# Patient Record
Sex: MALE | Race: WHITE | NOT HISPANIC OR LATINO | URBAN - METROPOLITAN AREA
[De-identification: names, ages, dates, MRNs, and addresses within clinical notes are randomized per-mention and may not be internally consistent; named-entity substitution may affect disease eponyms.]

---

## 2019-12-30 ENCOUNTER — EMERGENCY (EMERGENCY)
Facility: HOSPITAL | Age: 59
LOS: 0 days | Discharge: HOME | End: 2019-12-31
Attending: EMERGENCY MEDICINE | Admitting: EMERGENCY MEDICINE
Payer: SUBSIDIZED

## 2019-12-30 VITALS
RESPIRATION RATE: 18 BRPM | TEMPERATURE: 98 F | SYSTOLIC BLOOD PRESSURE: 163 MMHG | HEART RATE: 87 BPM | DIASTOLIC BLOOD PRESSURE: 80 MMHG | OXYGEN SATURATION: 100 %

## 2019-12-30 DIAGNOSIS — K40.90 UNILATERAL INGUINAL HERNIA, WITHOUT OBSTRUCTION OR GANGRENE, NOT SPECIFIED AS RECURRENT: ICD-10-CM

## 2019-12-30 DIAGNOSIS — R10.9 UNSPECIFIED ABDOMINAL PAIN: ICD-10-CM

## 2019-12-30 LAB
ALBUMIN SERPL ELPH-MCNC: 4.3 G/DL — SIGNIFICANT CHANGE UP (ref 3.5–5.2)
ALP SERPL-CCNC: 70 U/L — SIGNIFICANT CHANGE UP (ref 30–115)
ALT FLD-CCNC: 15 U/L — SIGNIFICANT CHANGE UP (ref 0–41)
ANION GAP SERPL CALC-SCNC: 13 MMOL/L — SIGNIFICANT CHANGE UP (ref 7–14)
APPEARANCE UR: CLEAR — SIGNIFICANT CHANGE UP
AST SERPL-CCNC: 25 U/L — SIGNIFICANT CHANGE UP (ref 0–41)
BILIRUB DIRECT SERPL-MCNC: <0.2 MG/DL — SIGNIFICANT CHANGE UP (ref 0–0.2)
BILIRUB INDIRECT FLD-MCNC: >0 MG/DL — LOW (ref 0.2–1.2)
BILIRUB SERPL-MCNC: 0.2 MG/DL — SIGNIFICANT CHANGE UP (ref 0.2–1.2)
BILIRUB UR-MCNC: NEGATIVE — SIGNIFICANT CHANGE UP
BUN SERPL-MCNC: 10 MG/DL — SIGNIFICANT CHANGE UP (ref 10–20)
CALCIUM SERPL-MCNC: 9.7 MG/DL — SIGNIFICANT CHANGE UP (ref 8.5–10.1)
CHLORIDE SERPL-SCNC: 101 MMOL/L — SIGNIFICANT CHANGE UP (ref 98–110)
CO2 SERPL-SCNC: 25 MMOL/L — SIGNIFICANT CHANGE UP (ref 17–32)
COLOR SPEC: SIGNIFICANT CHANGE UP
CREAT SERPL-MCNC: 0.8 MG/DL — SIGNIFICANT CHANGE UP (ref 0.7–1.5)
DIFF PNL FLD: NEGATIVE — SIGNIFICANT CHANGE UP
GLUCOSE SERPL-MCNC: 113 MG/DL — HIGH (ref 70–99)
GLUCOSE UR QL: NEGATIVE — SIGNIFICANT CHANGE UP
HCT VFR BLD CALC: 43.3 % — SIGNIFICANT CHANGE UP (ref 42–52)
HGB BLD-MCNC: 14.2 G/DL — SIGNIFICANT CHANGE UP (ref 14–18)
KETONES UR-MCNC: NEGATIVE — SIGNIFICANT CHANGE UP
LACTATE SERPL-SCNC: 1 MMOL/L — SIGNIFICANT CHANGE UP (ref 0.7–2)
LEUKOCYTE ESTERASE UR-ACNC: NEGATIVE — SIGNIFICANT CHANGE UP
LIDOCAIN IGE QN: 24 U/L — SIGNIFICANT CHANGE UP (ref 7–60)
MCHC RBC-ENTMCNC: 27.5 PG — SIGNIFICANT CHANGE UP (ref 27–31)
MCHC RBC-ENTMCNC: 32.8 G/DL — SIGNIFICANT CHANGE UP (ref 32–37)
MCV RBC AUTO: 83.9 FL — SIGNIFICANT CHANGE UP (ref 80–94)
NITRITE UR-MCNC: NEGATIVE — SIGNIFICANT CHANGE UP
NRBC # BLD: 0 /100 WBCS — SIGNIFICANT CHANGE UP (ref 0–0)
PH UR: 6 — SIGNIFICANT CHANGE UP (ref 5–8)
PLATELET # BLD AUTO: 199 K/UL — SIGNIFICANT CHANGE UP (ref 130–400)
POTASSIUM SERPL-MCNC: 4.4 MMOL/L — SIGNIFICANT CHANGE UP (ref 3.5–5)
POTASSIUM SERPL-SCNC: 4.4 MMOL/L — SIGNIFICANT CHANGE UP (ref 3.5–5)
PROT SERPL-MCNC: 6.7 G/DL — SIGNIFICANT CHANGE UP (ref 6–8)
PROT UR-MCNC: NEGATIVE — SIGNIFICANT CHANGE UP
RBC # BLD: 5.16 M/UL — SIGNIFICANT CHANGE UP (ref 4.7–6.1)
RBC # FLD: 12.3 % — SIGNIFICANT CHANGE UP (ref 11.5–14.5)
SODIUM SERPL-SCNC: 139 MMOL/L — SIGNIFICANT CHANGE UP (ref 135–146)
SP GR SPEC: 1.02 — SIGNIFICANT CHANGE UP (ref 1.01–1.02)
UROBILINOGEN FLD QL: SIGNIFICANT CHANGE UP
WBC # BLD: 5.52 K/UL — SIGNIFICANT CHANGE UP (ref 4.8–10.8)
WBC # FLD AUTO: 5.52 K/UL — SIGNIFICANT CHANGE UP (ref 4.8–10.8)

## 2019-12-30 PROCEDURE — 99285 EMERGENCY DEPT VISIT HI MDM: CPT

## 2019-12-30 PROCEDURE — 74177 CT ABD & PELVIS W/CONTRAST: CPT | Mod: 26

## 2019-12-30 RX ORDER — MORPHINE SULFATE 50 MG/1
4 CAPSULE, EXTENDED RELEASE ORAL ONCE
Refills: 0 | Status: DISCONTINUED | OUTPATIENT
Start: 2019-12-30 | End: 2019-12-30

## 2019-12-30 RX ORDER — SODIUM CHLORIDE 9 MG/ML
1000 INJECTION INTRAMUSCULAR; INTRAVENOUS; SUBCUTANEOUS ONCE
Refills: 0 | Status: COMPLETED | OUTPATIENT
Start: 2019-12-30 | End: 2019-12-30

## 2019-12-30 RX ADMIN — SODIUM CHLORIDE 2000 MILLILITER(S): 9 INJECTION INTRAMUSCULAR; INTRAVENOUS; SUBCUTANEOUS at 23:37

## 2019-12-30 RX ADMIN — MORPHINE SULFATE 4 MILLIGRAM(S): 50 CAPSULE, EXTENDED RELEASE ORAL at 20:35

## 2019-12-30 NOTE — ED PROVIDER NOTE - NS ED ROS FT
Constitutional: no fever, chills, no recent weight loss, change in appetite or malaise  Eyes: no redness/discharge/pain/vision changes  ENT: no rhinorrhea/ear pain/sore throat  Cardiac: No chest pain, SOB or edema.  Respiratory: No cough or respiratory distress  GI: + rt lower abdominal pain/swelling. No nausea, vomiting, diarrhea   : No dysuria, frequency, urgency or hematuria  MS: no pain to back or extremities, no loss of ROM, no weakness  Neuro: No headache or weakness. No LOC.  Skin: No skin rash.  Endocrine: No history of thyroid disease or diabetes.  Except as documented in the HPI, all other systems are negative.

## 2019-12-30 NOTE — ED ADULT NURSE NOTE - NSIMPLEMENTINTERV_GEN_ALL_ED
Implemented All Universal Safety Interventions:  [place] to call system. Call bell, personal items and telephone within reach. Instruct patient to call for assistance. Room bathroom lighting operational. Non-slip footwear when patient is off stretcher. Physically safe environment: no spills, clutter or unnecessary equipment. Stretcher in lowest position, wheels locked, appropriate side rails in place.

## 2019-12-30 NOTE — ED PROVIDER NOTE - NSFOLLOWUPCLINICS_GEN_ALL_ED_FT
[facility]  Surgery  [address] [place] [address]  Phone: [phone]  Fax:   Follow Up Time: 4-6 Days

## 2019-12-30 NOTE — ED PROVIDER NOTE - ATTENDING CONTRIBUTION TO CARE
Pt with right sided lower abd pain for the past 2 days.  pt was sent in for evaluation.  no cp, no sob, no fevers, no chills, no nausea, no vomiting.  pt has had pain for over a week but worse past 2 days.  pt also noticed hernia to right groin fort he past 3 weeks that goes in and out.  no urinary complaints.  no dysuria.    awake, alert.  abd soft with + RLQ tenderness.  Right groin with inguinal hernia that is reducible in the ED, no erythema.    p: labs, ct, reassess.

## 2019-12-30 NOTE — ED PROVIDER NOTE - PHYSICAL EXAMINATION
CONSTITUTIONAL: Well-appearing; well-nourished; in no apparent distress.   NECK: Supple; non-tender; no cervical lymphadenopathy.   CARDIOVASCULAR: Normal S1, S2; no murmurs, rubs, or gallops.   RESPIRATORY: Normal chest excursion with respiration; breath sounds clear and equal bilaterally; no wheezes, rhonchi, or rales.  GI/: Normal bowel sounds; non-distended; + mild RLQ ttp. No rebound or guarding. + rt sided reducible inguinal hernia  MS: No evidence of trauma or deformity. Normal ROM in all four extremities; non-tender to palpation; distal pulses are normal.   SKIN: Normal for age and race; warm; dry; good turgor; no apparent lesions or exudate.   NEURO/PSYCH: A & O x 4; grossly unremarkable. mood and manner are appropriate. Grooming and personal hygiene are appropriate. No apparent thoughts of harm to self or others.

## 2019-12-30 NOTE — ED PROVIDER NOTE - PATIENT PORTAL LINK FT
You can access the FollowMyHealth Patient Portal offered by [facility] by registering at the following website: [email or web address]. By joining [facility]’s FollowMyHealth portal, you will also be able to view your health information using other applications (apps) compatible with our system.

## 2019-12-30 NOTE — ED PROVIDER NOTE - PROGRESS NOTE DETAILS
pt with persistent pain.  CT with nonobstructed inguinal hernia.  hernia had gone in and out, but currently unable to be reduced.  Pt signed out to Dr. Reaves to follow up surgery consult and to reassess and dispo feeling well. no abdominal tenderness and no inguinal hernia tenderness. surgery evaluated patient. hernia easily reducible. f/u surgical clinic.

## 2019-12-30 NOTE — ED PROVIDER NOTE - NSFOLLOWUPINSTRUCTIONS_ED_ALL_ED_FT
Hernia    A hernia is when fat or the intestines push through a weak area in the abdominal wall. This can occur around the belly button (umbilical hernia) or where the leg meets the lower abdomen (inguinal hernia). This creates a rounded lump (bulge). Hernias that can be pushed back into the belly are reducible and those that cannot are called incarcerated. Hernias that are not reducible and lose their blood supply are called strangulated and require emergency surgery. Hernias can be caused when straining during bowel movements or lifting heavy objects as well as coughing.     SEEK IMMEDIATE MEDICAL CARE IF YOU HAVE THE FOLLOWING SYMPTOMS: worsening abdominal pain, change in color over the hernia, nausea/vomiting, or inability to have a bowel movement or pass gas.     Abdominal Pain    Many things can cause abdominal pain. Usually, abdominal pain is not caused by a disease and will improve without treatment. Your health care provider will do a physical exam and possibly order blood tests and imaging to help determine the seriousness of your pain. However, in many cases, no cause may be found and you may need further testing as an outpatient. Monitor your abdominal pain for any changes.     SEEK IMMEDIATE MEDICAL CARE IF YOU HAVE THE FOLLOWING SYMPTOMS: worsening abdominal pain, vomiting, diarrhea, inability to have bowel movements or pass gas, black or bloody stool, fever accompanying chest pain or back pain, or dizziness/lightheadedness.

## 2019-12-30 NOTE — ED PROVIDER NOTE - OBJECTIVE STATEMENT
58 year old M denies pmhx c/o rt lower abdominal pain/swelling x several weeks. Sts the pain has worsened over the last two days. Symptoms are worsened with coughing/sneezing, straining on the toilet. Denies any assoc fever/chills, nausea, vomiting, alterations in bowel habits, past surgical hx, chest pain, sob.

## 2019-12-30 NOTE — ED PROVIDER NOTE - CLINICAL SUMMARY MEDICAL DECISION MAKING FREE TEXT BOX
Patient reval post surgical consult and signout by Dr. Khan.  Pain is now in RUQ, not associated with hernia.  mild.  Abd benign.  Surgery requesting outpatient follow up in clinic for elective cholecystectomy and hernia repair.  Patient agreeable.  stable for discharge.  I have fully discussed the medical management and delivery of care with the patient. I have discussed any available labs, imaging and treatment options with the patient. Patient confirms understanding and has been given detailed return precautions. Patient instructed to return to the ED should symptoms persist or worsen. Patient has demonstrated capacity and has verbalized understanding. Patient is well appearing upon discharge.

## 2019-12-31 VITALS
DIASTOLIC BLOOD PRESSURE: 72 MMHG | SYSTOLIC BLOOD PRESSURE: 156 MMHG | OXYGEN SATURATION: 98 % | TEMPERATURE: 98 F | HEART RATE: 72 BPM | RESPIRATION RATE: 18 BRPM

## 2019-12-31 PROCEDURE — 99282 EMERGENCY DEPT VISIT SF MDM: CPT

## 2019-12-31 RX ORDER — HYDROCORTISONE 1 %
1 OINTMENT (GRAM) TOPICAL
Qty: 28 | Refills: 0
Start: 2019-12-31 | End: 2020-01-13

## 2019-12-31 RX ORDER — FAMOTIDINE 10 MG/ML
1 INJECTION INTRAVENOUS
Qty: 60 | Refills: 0
Start: 2019-12-31 | End: 2020-01-29

## 2019-12-31 NOTE — CONSULT NOTE ADULT - ASSESSMENT
Assessment: Patient is a 60 y/o M with PMHx of gallstones presents to the ED for worsening abdominal pain. CTAP reveals R inguinal hernia, spontaneously reduced in the ED.      Plan:  - monitor pain for the next hour.  If pain persists, then obtain abdominal US.  - f/u general surgery clinic Assessment:   Patient is a 58 y/o M with PMHx of gallstones dx 5y ago with no follow-up with a surgeon in the past, whom presents to the ED for intermittent R sided abdominal pain for 1 month. He was found to have a reducible R inguinal hernia containing bowel without evidence of obstruction on CTAP. He has cholelithiasis without any signs of cholecystitis.    Plan:  - Continue to monitor in ED for recurrence of abdominal pain after pain medication wears off  - If recurrent pain or worsening pain then US RUQ  - Labs, imaging, and clinical picture not suggestive of cholecystitis  - Hernia reducible with no skin changes or inflammatory changes on imaging, not likely attributing to his pain  - No acute surgical intervention indicated at this time  -  f/u general surgery clinic if symptoms persist or recur    Senior Surgical Resident Note  I have edited the above note and agree with the current treatment plan  Above plan was discussed with Dr. Park, patient, and the ED team  ---------------------------------------------------------------------------------------  12-31-19 @ 02:23

## 2019-12-31 NOTE — CONSULT NOTE ADULT - SUBJECTIVE AND OBJECTIVE BOX
HPI: Patient is a 58 y/o M with PMHx of gallstones presents to the ED for abdominal pain.  He states that he has had worsening pain for 1 month.  The pain has improved compared to several hours ago due to pain medication given in the ED.  He admits that this type of pain has occurred previously about 4-5 years ago.  Patient states that he has had 2 colonoscopies, the most recent being in 2018 where one polyp was removed.  He admits to having one BM this afternoon.        PAST MEDICAL & SURGICAL HISTORY:  Cholelithiasis    Allergies: No Known Allergies      REVIEW OF SYSTEMS    [x] A ten-point review of systems was otherwise negative except as noted.  [ ] Due to altered mental status/intubation, subjective information were not able to be obtained from the patient. History was obtained, to the extent possible, from review of the chart and collateral sources of information.      Vital Signs Last 24 Hrs  T(C): 36.8 (30 Dec 2019 15:25), Max: 36.8 (30 Dec 2019 15:25)  T(F): 98.3 (30 Dec 2019 15:25), Max: 98.3 (30 Dec 2019 15:25)  HR: 56 (30 Dec 2019 19:30) (56 - 87)  BP: 197/93 (30 Dec 2019 19:30) (163/80 - 197/93)  RR: 18 (30 Dec 2019 19:30) (18 - 18)  SpO2: 99% (30 Dec 2019 19:30) (99% - 100%)    PHYSICAL EXAM:  GENERAL: NAD, well-appearing  CHEST/LUNG: Clear to auscultation bilaterally  HEART: Regular rate and rhythm  ABDOMEN: Soft, mildly tender RLQ, Nondistended; R inguinal hernia spontaneously reducible  EXTREMITIES:  No clubbing, cyanosis, or edema      LABS:                       14.2   5.52  )-----------( 199      ( 30 Dec 2019 17:43 )             43.3             139  |  101  |  10  ----------------------------<  113<H>  4.4   |  25  |  0.8      Calcium, Total Serum: 9.7 mg/dL (- @ 17:43)      LFTs:             6.7  | 0.2  | 25       ------------------[70      ( 30 Dec 2019 17:43 )  4.3  | <0.2 | 15          Lipase:24            Lactate, Blood: 1.0 mmol/L (19 @ 17:43)    Urinalysis Basic - ( 30 Dec 2019 17:43 )    Color: Light Yellow / Appearance: Clear / S.020 / pH: x  Gluc: x / Ketone: Negative  / Bili: Negative / Urobili: <2 mg/dL   Blood: x / Protein: Negative / Nitrite: Negative   Leuk Esterase: Negative / RBC: x / WBC x   Sq Epi: x / Non Sq Epi: x / Bacteria: x      RADIOLOGY & ADDITIONAL STUDIES:    < from: CT Abdomen and Pelvis w/ IV Cont (19 @ 21:45) >  IMPRESSION:   No CT evidence of acute abdominopelvic pathology.   Right inguinal hernia containing loops of nonobstructed bowel.   < end of copied text >

## 2020-01-03 ENCOUNTER — APPOINTMENT (OUTPATIENT)
Dept: SURGERY | Facility: CLINIC | Age: 60
End: 2020-01-03
Payer: SUBSIDIZED

## 2020-01-03 VITALS
HEART RATE: 72 BPM | TEMPERATURE: 98.3 F | HEIGHT: 65 IN | DIASTOLIC BLOOD PRESSURE: 90 MMHG | SYSTOLIC BLOOD PRESSURE: 142 MMHG | BODY MASS INDEX: 26.49 KG/M2 | WEIGHT: 159 LBS

## 2020-01-03 DIAGNOSIS — Z78.9 OTHER SPECIFIED HEALTH STATUS: ICD-10-CM

## 2020-01-03 DIAGNOSIS — Z86.010 PERSONAL HISTORY OF COLONIC POLYPS: ICD-10-CM

## 2020-01-03 PROCEDURE — 99214 OFFICE O/P EST MOD 30 MIN: CPT

## 2020-01-03 NOTE — CONSULT LETTER
[Dear  ___] : Dear  [unfilled], [Please see my note below.] : Please see my note below. [Sincerely,] : Sincerely, [Consult Letter:] : I had the pleasure of evaluating your patient, [unfilled]. [FreeTextEntry3] : Caron Mittal MD\par Bariatric & Minimally Invasive Surgery\par [facility]\par [phone]\par

## 2020-01-03 NOTE — ASSESSMENT
[FreeTextEntry1] : 60yo male with no significant PMHx presenting with right inguinal hernia, possible bilateral inguinal hernia, +umbilical hernia - asx.\par -recent colonoscopy\par -risks, benefits, and alteratives were explained to the patient - including risk of bleeding, pain, and infection\par -all questions were answered\par -consent was obtained for LAPAROSCOPIC RIGHT INGUINAL HERNIA REPAIR WITH MESH, POSSIBLE OPEN, POSSIBLE BILATERAL AND ALL INDICATED PROCEDURES\par -will require PAT's\par -return to office post-operatively\par

## 2020-01-03 NOTE — PHYSICAL EXAM
[Normal Breath Sounds] : Normal breath sounds [Normal Heart Sounds] : normal heart sounds [Alert] : alert [Calm] : calm [de-identified] : no acute distress [de-identified] : soft, +right inguinal hernia, ?left inguinal hernia, +umbilical hernia, non-tender, no rebound/guarding, no skin changes [de-identified] : full ROM x 4

## 2020-01-03 NOTE — HISTORY OF PRESENT ILLNESS
[de-identified] : 58yo male with no known PMHx referred to office for evaluation of right inguinal hernia. Patient reports that he has been experiencing pain and swelling in the right groin over the past month. No previous episodes. No left sided pain. No obstructive symptoms. No fever/chills, nausea/vomiting. Patient had colonoscopy 1 year ago that demonstrated a polyp, which was removed.

## 2020-01-03 NOTE — DATA REVIEWED
[FreeTextEntry1] : CT A/P 12/30/2019 - no CT evidence of acute abdominopelvic pathology; +cholelithiasis, +mesenteric fat-containing umbilical hernia, +right inguinal hernia containing loops of non-obstructed bowel

## 2020-01-16 ENCOUNTER — FORM ENCOUNTER (OUTPATIENT)
Age: 60
End: 2020-01-16

## 2020-01-17 ENCOUNTER — OUTPATIENT (OUTPATIENT)
Dept: OUTPATIENT SERVICES | Facility: HOSPITAL | Age: 60
LOS: 1 days | Discharge: HOME | End: 2020-01-17
Payer: SUBSIDIZED

## 2020-01-17 VITALS
DIASTOLIC BLOOD PRESSURE: 76 MMHG | HEART RATE: 54 BPM | RESPIRATION RATE: 16 BRPM | TEMPERATURE: 97 F | HEIGHT: 65 IN | SYSTOLIC BLOOD PRESSURE: 159 MMHG | OXYGEN SATURATION: 100 % | WEIGHT: 165.57 LBS

## 2020-01-17 DIAGNOSIS — Z01.818 ENCOUNTER FOR OTHER PREPROCEDURAL EXAMINATION: ICD-10-CM

## 2020-01-17 DIAGNOSIS — K40.90 UNILATERAL INGUINAL HERNIA, WITHOUT OBSTRUCTION OR GANGRENE, NOT SPECIFIED AS RECURRENT: ICD-10-CM

## 2020-01-17 LAB
ALBUMIN SERPL ELPH-MCNC: 4.6 G/DL — SIGNIFICANT CHANGE UP (ref 3.5–5.2)
ALP SERPL-CCNC: 77 U/L — SIGNIFICANT CHANGE UP (ref 30–115)
ALT FLD-CCNC: 14 U/L — SIGNIFICANT CHANGE UP (ref 0–41)
ANION GAP SERPL CALC-SCNC: 11 MMOL/L — SIGNIFICANT CHANGE UP (ref 7–14)
APTT BLD: 35.6 SEC — SIGNIFICANT CHANGE UP (ref 27–39.2)
AST SERPL-CCNC: 22 U/L — SIGNIFICANT CHANGE UP (ref 0–41)
BILIRUB SERPL-MCNC: 0.3 MG/DL — SIGNIFICANT CHANGE UP (ref 0.2–1.2)
BUN SERPL-MCNC: 8 MG/DL — LOW (ref 10–20)
CALCIUM SERPL-MCNC: 9.3 MG/DL — SIGNIFICANT CHANGE UP (ref 8.5–10.1)
CHLORIDE SERPL-SCNC: 103 MMOL/L — SIGNIFICANT CHANGE UP (ref 98–110)
CO2 SERPL-SCNC: 28 MMOL/L — SIGNIFICANT CHANGE UP (ref 17–32)
CREAT SERPL-MCNC: 0.8 MG/DL — SIGNIFICANT CHANGE UP (ref 0.7–1.5)
GLUCOSE SERPL-MCNC: 101 MG/DL — HIGH (ref 70–99)
INR BLD: 0.97 RATIO — SIGNIFICANT CHANGE UP (ref 0.65–1.3)
POTASSIUM SERPL-MCNC: 4.4 MMOL/L — SIGNIFICANT CHANGE UP (ref 3.5–5)
POTASSIUM SERPL-SCNC: 4.4 MMOL/L — SIGNIFICANT CHANGE UP (ref 3.5–5)
PROT SERPL-MCNC: 6.7 G/DL — SIGNIFICANT CHANGE UP (ref 6–8)
PROTHROM AB SERPL-ACNC: 11.2 SEC — SIGNIFICANT CHANGE UP (ref 9.95–12.87)
SODIUM SERPL-SCNC: 142 MMOL/L — SIGNIFICANT CHANGE UP (ref 135–146)

## 2020-01-17 PROCEDURE — 93010 ELECTROCARDIOGRAM REPORT: CPT

## 2020-01-17 PROCEDURE — 71046 X-RAY EXAM CHEST 2 VIEWS: CPT | Mod: 26

## 2020-01-17 NOTE — H&P PST ADULT - NSANTHOSAYNRD_GEN_A_CORE
No. MARY BETH screening performed.  STOP BANG Legend: 0-2 = LOW Risk; 3-4 = INTERMEDIATE Risk; 5-8 = HIGH Risk

## 2020-01-17 NOTE — H&P PST ADULT - HISTORY OF PRESENT ILLNESS
59 year old male here with right hernia had for about 2 months  fos=2 denies chest pain sob palp  denies recent uri or uti

## 2020-01-21 ENCOUNTER — APPOINTMENT (OUTPATIENT)
Dept: INTERNAL MEDICINE | Facility: CLINIC | Age: 60
End: 2020-01-21
Payer: COMMERCIAL

## 2020-01-21 ENCOUNTER — FORM ENCOUNTER (OUTPATIENT)
Age: 60
End: 2020-01-21

## 2020-01-21 ENCOUNTER — OUTPATIENT (OUTPATIENT)
Dept: OUTPATIENT SERVICES | Facility: HOSPITAL | Age: 60
LOS: 1 days | Discharge: HOME | End: 2020-01-21

## 2020-01-21 VITALS
BODY MASS INDEX: 26.49 KG/M2 | WEIGHT: 159 LBS | HEIGHT: 65 IN | SYSTOLIC BLOOD PRESSURE: 157 MMHG | DIASTOLIC BLOOD PRESSURE: 94 MMHG

## 2020-01-21 DIAGNOSIS — R94.31 ABNORMAL ELECTROCARDIOGRAM [ECG] [EKG]: ICD-10-CM

## 2020-01-21 DIAGNOSIS — K40.90 UNILATERAL INGUINAL HERNIA, WITHOUT OBSTRUCTION OR GANGRENE, NOT SPECIFIED AS RECURRENT: ICD-10-CM

## 2020-01-21 PROBLEM — I10 ESSENTIAL (PRIMARY) HYPERTENSION: Chronic | Status: ACTIVE | Noted: 2020-01-17

## 2020-01-21 PROCEDURE — 99202 OFFICE O/P NEW SF 15 MIN: CPT | Mod: GC

## 2020-01-21 NOTE — HISTORY OF PRESENT ILLNESS
[No Pertinent Cardiac History] : no history of aortic stenosis, atrial fibrillation, coronary artery disease, recent myocardial infarction, or implantable device/pacemaker [No Pertinent Pulmonary History] : no history of asthma, COPD, sleep apnea, or smoking [No Adverse Anesthesia Reaction] : no adverse anesthesia reaction in self or family member [Good (7-10 METs)] : Good (7-10 METs) [Chronic Anticoagulation] : no chronic anticoagulation [Chronic Kidney Disease] : no chronic kidney disease [FreeTextEntry4] : 59 year old man with hx of biliary colic, non smoker, presenting for pre-op checkup. Patient was diagnosed with right sided abdominal inguinal hernia and will undergo surgery on 1/31/2020. He reports abdominal pain  still being present. no chest pain, has some palpitations. no dyspnea but reports some sob on mild exertion. no PND or orthopnea. reports that he can go up stairs with no difficulty./ no dizziness or headache. no diarrhea or vomiting. no cough. no FH of cardiac diseases [Diabetes] : no diabetes [FreeTextEntry7] : EKG showed sinus bradycardia and nonspecific T wave abnormality

## 2020-01-21 NOTE — ASSESSMENT
[Patient Requires Further Testing] : Patient requires further testing [Echocardiogram] : echocardiogram [FreeTextEntry4] : 59 year old man presenting for pre-op assessment for right inguinal hernia repair. he has some SOB on exertion and palpitations with no other symptoms. his bp was elevated on this visit\par \par Patient needs further evaluation by cardiologist before surgery\par \par Plan:\par 1- Echocardiogram\par 2- fu with cardio pre-op

## 2020-01-21 NOTE — PHYSICAL EXAM
[No Acute Distress] : no acute distress [Well Nourished] : well nourished [No JVD] : no jugular venous distention [No Respiratory Distress] : no respiratory distress  [Normal Rate] : normal rate  [Regular Rhythm] : with a regular rhythm [No Murmur] : no murmur heard [Normal S1, S2] : normal S1 and S2 [No Carotid Bruits] : no carotid bruits [No Edema] : there was no peripheral edema [Coordination Grossly Intact] : coordination grossly intact [Normal Affect] : the affect was normal [No Focal Deficits] : no focal deficits

## 2020-01-22 ENCOUNTER — OUTPATIENT (OUTPATIENT)
Dept: OUTPATIENT SERVICES | Facility: HOSPITAL | Age: 60
LOS: 1 days | Discharge: HOME | End: 2020-01-22
Payer: SUBSIDIZED

## 2020-01-22 ENCOUNTER — APPOINTMENT (OUTPATIENT)
Dept: INTERNAL MEDICINE | Facility: CLINIC | Age: 60
End: 2020-01-22

## 2020-01-22 DIAGNOSIS — R94.31 ABNORMAL ELECTROCARDIOGRAM [ECG] [EKG]: ICD-10-CM

## 2020-01-22 PROCEDURE — 93306 TTE W/DOPPLER COMPLETE: CPT | Mod: 26

## 2020-02-04 ENCOUNTER — OUTPATIENT (OUTPATIENT)
Dept: OUTPATIENT SERVICES | Facility: HOSPITAL | Age: 60
LOS: 1 days | Discharge: HOME | End: 2020-02-04

## 2020-02-04 ENCOUNTER — APPOINTMENT (OUTPATIENT)
Dept: CARDIOLOGY | Facility: CLINIC | Age: 60
End: 2020-02-04
Payer: COMMERCIAL

## 2020-02-04 VITALS
HEIGHT: 65 IN | OXYGEN SATURATION: 98 % | RESPIRATION RATE: 16 BRPM | HEART RATE: 62 BPM | DIASTOLIC BLOOD PRESSURE: 105 MMHG | SYSTOLIC BLOOD PRESSURE: 174 MMHG | BODY MASS INDEX: 26.49 KG/M2 | WEIGHT: 159 LBS

## 2020-02-04 VITALS — DIASTOLIC BLOOD PRESSURE: 90 MMHG | SYSTOLIC BLOOD PRESSURE: 173 MMHG

## 2020-02-04 DIAGNOSIS — R94.31 ABNORMAL ELECTROCARDIOGRAM [ECG] [EKG]: ICD-10-CM

## 2020-02-04 DIAGNOSIS — Z01.810 ENCOUNTER FOR PREPROCEDURAL CARDIOVASCULAR EXAMINATION: ICD-10-CM

## 2020-02-04 DIAGNOSIS — K40.90 UNILATERAL INGUINAL HERNIA, WITHOUT OBSTRUCTION OR GANGRENE, NOT SPECIFIED AS RECURRENT: ICD-10-CM

## 2020-02-04 DIAGNOSIS — K94.31: ICD-10-CM

## 2020-02-04 PROCEDURE — 99204 OFFICE O/P NEW MOD 45 MIN: CPT

## 2020-02-04 NOTE — HISTORY OF PRESENT ILLNESS
[FreeTextEntry1] : 58 yo M with h/o HTN (non on meds) referred for risk stratification prior to right hernia repair.  Denies any chest pain, shortness of breath, palpitations, orthopnea/PND.  Labs reviewed.  METs >4.\par \par Risk Factors:  HTN\par \par EKG (1/17/2020):  SB 59 bpm, NT\par Echo (1/22/2020):  EF 60-65%, Mild TR\par \par

## 2020-02-04 NOTE — PHYSICAL EXAM
[General Appearance - Well Developed] : well developed [General Appearance - In No Acute Distress] : no acute distress [Normal Conjunctiva] : the conjunctiva exhibited no abnormalities [Eyelids - No Xanthelasma] : the eyelids demonstrated no xanthelasmas [Heart Rate And Rhythm] : heart rate and rhythm were normal [Heart Sounds] : normal S1 and S2 [Murmurs] : no murmurs present [Respiration, Rhythm And Depth] : normal respiratory rhythm and effort [Exaggerated Use Of Accessory Muscles For Inspiration] : no accessory muscle use [Auscultation Breath Sounds / Voice Sounds] : lungs were clear to auscultation bilaterally [Abdomen Soft] : soft [Abdomen Tenderness] : non-tender [Abnormal Walk] : normal gait [Abdomen Mass (___ Cm)] : no abdominal mass palpated [Gait - Sufficient For Exercise Testing] : the gait was sufficient for exercise testing [Nail Clubbing] : no clubbing of the fingernails [Cyanosis, Localized] : no localized cyanosis [Skin Color & Pigmentation] : normal skin color and pigmentation [] : no rash [No Skin Ulcers] : no skin ulcer [Oriented To Time, Place, And Person] : oriented to person, place, and time [FreeTextEntry1] : no JVD

## 2020-02-06 ENCOUNTER — APPOINTMENT (OUTPATIENT)
Dept: SURGERY | Facility: HOSPITAL | Age: 60
End: 2020-02-06

## 2020-02-06 ENCOUNTER — OUTPATIENT (OUTPATIENT)
Dept: OUTPATIENT SERVICES | Facility: HOSPITAL | Age: 60
LOS: 1 days | Discharge: HOME | End: 2020-02-06
Payer: SUBSIDIZED

## 2020-02-06 VITALS — SYSTOLIC BLOOD PRESSURE: 150 MMHG | RESPIRATION RATE: 18 BRPM | DIASTOLIC BLOOD PRESSURE: 80 MMHG | HEART RATE: 97 BPM

## 2020-02-06 VITALS
HEIGHT: 65 IN | TEMPERATURE: 98 F | RESPIRATION RATE: 18 BRPM | SYSTOLIC BLOOD PRESSURE: 135 MMHG | WEIGHT: 160.06 LBS | HEART RATE: 74 BPM | DIASTOLIC BLOOD PRESSURE: 88 MMHG

## 2020-02-06 DIAGNOSIS — K40.90 UNILATERAL INGUINAL HERNIA, WITHOUT OBSTRUCTION OR GANGRENE, NOT SPECIFIED AS RECURRENT: ICD-10-CM

## 2020-02-06 PROCEDURE — 49650 LAP ING HERNIA REPAIR INIT: CPT

## 2020-02-06 RX ORDER — ACETAMINOPHEN 500 MG
1000 TABLET ORAL ONCE
Refills: 0 | Status: COMPLETED | OUTPATIENT
Start: 2020-02-06 | End: 2020-02-06

## 2020-02-06 RX ORDER — OXYCODONE AND ACETAMINOPHEN 5; 325 MG/1; MG/1
1 TABLET ORAL ONCE
Refills: 0 | Status: DISCONTINUED | OUTPATIENT
Start: 2020-02-06 | End: 2020-02-06

## 2020-02-06 RX ORDER — ONDANSETRON 8 MG/1
4 TABLET, FILM COATED ORAL ONCE
Refills: 0 | Status: COMPLETED | OUTPATIENT
Start: 2020-02-06 | End: 2020-02-06

## 2020-02-06 RX ORDER — OXYCODONE AND ACETAMINOPHEN 5; 325 MG/1; MG/1
2 TABLET ORAL ONCE
Refills: 0 | Status: DISCONTINUED | OUTPATIENT
Start: 2020-02-06 | End: 2020-02-06

## 2020-02-06 RX ORDER — BUPIVACAINE 13.3 MG/ML
20 INJECTION, SUSPENSION, LIPOSOMAL INFILTRATION ONCE
Refills: 0 | Status: COMPLETED | OUTPATIENT
Start: 2020-02-06 | End: 2020-02-06

## 2020-02-06 RX ORDER — HYDROMORPHONE HYDROCHLORIDE 2 MG/ML
0.5 INJECTION INTRAMUSCULAR; INTRAVENOUS; SUBCUTANEOUS
Refills: 0 | Status: DISCONTINUED | OUTPATIENT
Start: 2020-02-06 | End: 2020-02-06

## 2020-02-06 RX ORDER — HYDROMORPHONE HYDROCHLORIDE 2 MG/ML
1 INJECTION INTRAMUSCULAR; INTRAVENOUS; SUBCUTANEOUS
Refills: 0 | Status: DISCONTINUED | OUTPATIENT
Start: 2020-02-06 | End: 2020-02-06

## 2020-02-06 RX ORDER — MEPERIDINE HYDROCHLORIDE 50 MG/ML
12.5 INJECTION INTRAMUSCULAR; INTRAVENOUS; SUBCUTANEOUS
Refills: 0 | Status: DISCONTINUED | OUTPATIENT
Start: 2020-02-06 | End: 2020-02-06

## 2020-02-06 RX ORDER — SODIUM CHLORIDE 9 MG/ML
1000 INJECTION, SOLUTION INTRAVENOUS
Refills: 0 | Status: DISCONTINUED | OUTPATIENT
Start: 2020-02-06 | End: 2020-02-06

## 2020-02-06 RX ADMIN — SODIUM CHLORIDE 125 MILLILITER(S): 9 INJECTION, SOLUTION INTRAVENOUS at 14:00

## 2020-02-06 RX ADMIN — HYDROMORPHONE HYDROCHLORIDE 0.5 MILLIGRAM(S): 2 INJECTION INTRAMUSCULAR; INTRAVENOUS; SUBCUTANEOUS at 15:15

## 2020-02-06 RX ADMIN — ONDANSETRON 4 MILLIGRAM(S): 8 TABLET, FILM COATED ORAL at 15:45

## 2020-02-06 RX ADMIN — HYDROMORPHONE HYDROCHLORIDE 0.5 MILLIGRAM(S): 2 INJECTION INTRAMUSCULAR; INTRAVENOUS; SUBCUTANEOUS at 14:50

## 2020-02-06 RX ADMIN — Medication 400 MILLIGRAM(S): at 10:05

## 2020-02-06 RX ADMIN — BUPIVACAINE 20 MILLILITER(S): 13.3 INJECTION, SUSPENSION, LIPOSOMAL INFILTRATION at 09:00

## 2020-02-06 RX ADMIN — OXYCODONE AND ACETAMINOPHEN 1 TABLET(S): 5; 325 TABLET ORAL at 16:14

## 2020-02-06 RX ADMIN — HYDROMORPHONE HYDROCHLORIDE 0.5 MILLIGRAM(S): 2 INJECTION INTRAMUSCULAR; INTRAVENOUS; SUBCUTANEOUS at 14:35

## 2020-02-06 RX ADMIN — HYDROMORPHONE HYDROCHLORIDE 0.5 MILLIGRAM(S): 2 INJECTION INTRAMUSCULAR; INTRAVENOUS; SUBCUTANEOUS at 15:30

## 2020-02-06 NOTE — ASU DISCHARGE PLAN (ADULT/PEDIATRIC) - DRIVING
No.../Please no driving or operating heavy machinery while taking prescribed narcotic pain medications

## 2020-02-06 NOTE — ASU DISCHARGE PLAN (ADULT/PEDIATRIC) - WORK/SCHOOL DURATION DAY(S)
3-5 days as tolerated, with modified/light duty as specified in the physical activity instructions outlined below.

## 2020-02-06 NOTE — ASU DISCHARGE PLAN (ADULT/PEDIATRIC) - ASU DC SPECIAL INSTRUCTIONSFT
Patient Name: LAYO ALFONSO  MRN: [id number]  59y Male  Location: [facility] ([facility])    Post Operative Instructions  Please see wound care and activity instructions as documented above.    Pain medication prescribed:   Percocet 5/325 oral tablet: 1 tab(s) orally every 6 hours, As Needed MDD:4 tablets      - Please take pain medications prescribed only as needed for severe pain, otherwise you may take Tylenol, 650mg every 6 hours as needed and/or Motrin, 600mg every 6 hours as needed for mild pain control    Additional Instructions:  Please use scrotal support at all times except when showering.  Please call the clinic and confirm your appointment for follow up, see the follow up instructions and the physician's office number  below. Please call the clinic for any questions or concerns.    02-06-20 @ 13:46

## 2020-02-06 NOTE — ASU DISCHARGE PLAN (ADULT/PEDIATRIC) - ACTIVITY LEVEL
No intercourse/No heavy lifting/No sports/gym/No excercise/Light daily activity as tolerated is permitted, Please avoid any heavy lifting >10-15lbs, strenuous physical activity, straining with bowel movements, or heavy exercise.

## 2020-02-06 NOTE — ASU DISCHARGE PLAN (ADULT/PEDIATRIC) - CARE PROVIDER_API CALL
Caron Mittal)  [facility]  [address] [place] [address] [address] [place] [address]  Phone: [phone]  Fax: [phone]  Established Patient  Follow Up Time: 1 week

## 2020-02-12 ENCOUNTER — APPOINTMENT (OUTPATIENT)
Dept: SURGERY | Facility: CLINIC | Age: 60
End: 2020-02-12
Payer: SUBSIDIZED

## 2020-02-12 VITALS
SYSTOLIC BLOOD PRESSURE: 120 MMHG | HEIGHT: 65 IN | HEART RATE: 78 BPM | BODY MASS INDEX: 26.49 KG/M2 | TEMPERATURE: 97.9 F | WEIGHT: 159 LBS | DIASTOLIC BLOOD PRESSURE: 80 MMHG

## 2020-02-12 DIAGNOSIS — K40.20 BILATERAL INGUINAL HERNIA, WITHOUT OBSTRUCTION OR GANGRENE, NOT SPECIFIED AS RECURRENT: ICD-10-CM

## 2020-02-12 DIAGNOSIS — Z87.19 PERSONAL HISTORY OF OTHER DISEASES OF THE DIGESTIVE SYSTEM: ICD-10-CM

## 2020-02-12 PROCEDURE — 99024 POSTOP FOLLOW-UP VISIT: CPT

## 2020-02-13 PROBLEM — Z87.19 HISTORY OF RIGHT INGUINAL HERNIA: Status: RESOLVED | Noted: 2020-01-03 | Resolved: 2020-02-13

## 2020-02-13 RX ORDER — DICYCLOMINE HYDROCHLORIDE 20 MG/1
20 TABLET ORAL EVERY 6 HOURS
Qty: 120 | Refills: 2 | Status: COMPLETED | COMMUNITY
Start: 2020-01-28 | End: 2020-02-13

## 2020-02-13 NOTE — PHYSICAL EXAM
[Normal Breath Sounds] : Normal breath sounds [Alert] : alert [Calm] : calm [Normal Heart Sounds] : normal heart sounds [de-identified] : soft, umbilical and paramedian incisions with minimal ecchymosis, no expressible drainage or induration; minimal scrotal swelling, both testicles palpable, no rebound/guarding [de-identified] : no acute distress [de-identified] : full ROM x 4

## 2020-02-13 NOTE — HISTORY OF PRESENT ILLNESS
[de-identified] : 58yo male with no known PMHx s/p bilateral laparoscopic inguinal hernia repair with mesh 2/6/2020 presenting for post-operative evaluation. Pain controlled with narcotics. Tolerating diet. Minimal swelling. Denies fever/chlls, chest pain or shortness of breath. Disliked scrotal support given after surgery. Ambulating well.

## 2020-02-13 NOTE — ASSESSMENT
[FreeTextEntry1] : 58yo male s/p laparoscopic bilateral inguinal hernia repair with mesh 2/6/2020. Doing well.\par -D/C scrotal support device, may use supportive underwear\par -D/C narcotics, recommend extra-strength tylenol\par -avoid constipation - drink plenty of water, eat fiber\par -no heavy lifting x 6 weeks total\par -note given for work\par -return to office in 2 weeks

## 2020-02-28 ENCOUNTER — APPOINTMENT (OUTPATIENT)
Dept: SURGERY | Facility: CLINIC | Age: 60
End: 2020-02-28
Payer: SUBSIDIZED

## 2020-02-28 VITALS
SYSTOLIC BLOOD PRESSURE: 103 MMHG | DIASTOLIC BLOOD PRESSURE: 79 MMHG | HEART RATE: 66 BPM | WEIGHT: 161 LBS | HEIGHT: 65 IN | TEMPERATURE: 97.7 F | BODY MASS INDEX: 26.82 KG/M2

## 2020-02-28 DIAGNOSIS — Z87.19 PERSONAL HISTORY OF OTHER DISEASES OF THE DIGESTIVE SYSTEM: ICD-10-CM

## 2020-02-28 PROCEDURE — 99024 POSTOP FOLLOW-UP VISIT: CPT

## 2020-02-28 RX ORDER — OXYCODONE AND ACETAMINOPHEN 5; 325 MG/1; MG/1
5-325 TABLET ORAL
Qty: 5 | Refills: 0 | Status: DISCONTINUED | COMMUNITY
Start: 2020-02-28 | End: 2020-02-28

## 2020-02-28 NOTE — ASSESSMENT
[FreeTextEntry1] : 60yo male s/p laparoscopic bilateral inguinal hernia repair with mesh 2/6/2020. Doing well.\par -Rx 5 tablets of percocet \par -avoid constipation - drink plenty of water, eat fiber\par -no heavy lifting x 6 weeks total\par -note given to clear for work 3/30/20 with limitations x 2 months\par -return to office PRN - particularly fevers, worsening abdominal pain, drainage from wound\par

## 2020-02-28 NOTE — HISTORY OF PRESENT ILLNESS
[de-identified] : 60yo male with no known PMHx s/p bilateral laparoscopic inguinal hernia repair with mesh 2/6/2020 presenting for post-operative evaluation. He was seen 1 week after surgery and was doing well except for pain control. At this time, he is tolerating diet, having bowel function. Breathing well, no chest pain or shortness of breath. Denies fever/chlls, ambulating well.

## 2020-02-28 NOTE — PHYSICAL EXAM
[Normal Breath Sounds] : Normal breath sounds [Normal Heart Sounds] : normal heart sounds [Alert] : alert [Calm] : calm [de-identified] : no acute distress [de-identified] : soft, umbilical and paramedian incisions with minimal ecchymosis, no expressible drainage or induration; minimal scrotal swelling, both testicles palpable, no rebound/guarding [de-identified] : full ROM x 4

## 2020-07-30 ENCOUNTER — INPATIENT (INPATIENT)
Facility: HOSPITAL | Age: 60
LOS: 1 days | Discharge: HOME | End: 2020-08-01
Attending: INTERNAL MEDICINE | Admitting: INTERNAL MEDICINE
Payer: COMMERCIAL

## 2020-07-30 VITALS
WEIGHT: 164.91 LBS | DIASTOLIC BLOOD PRESSURE: 97 MMHG | RESPIRATION RATE: 18 BRPM | HEART RATE: 87 BPM | SYSTOLIC BLOOD PRESSURE: 181 MMHG | HEIGHT: 67 IN | OXYGEN SATURATION: 98 % | TEMPERATURE: 98 F

## 2020-07-30 DIAGNOSIS — R51 HEADACHE: ICD-10-CM

## 2020-07-30 LAB
ALBUMIN SERPL ELPH-MCNC: 5 G/DL — SIGNIFICANT CHANGE UP (ref 3.5–5.2)
ALP SERPL-CCNC: 65 U/L — SIGNIFICANT CHANGE UP (ref 30–115)
ALT FLD-CCNC: 14 U/L — SIGNIFICANT CHANGE UP (ref 0–41)
ANION GAP SERPL CALC-SCNC: 15 MMOL/L — HIGH (ref 7–14)
APPEARANCE UR: CLEAR — SIGNIFICANT CHANGE UP
APTT BLD: 25.9 SEC — LOW (ref 27–39.2)
AST SERPL-CCNC: 22 U/L — SIGNIFICANT CHANGE UP (ref 0–41)
BASE EXCESS BLDV CALC-SCNC: -0.9 MMOL/L — SIGNIFICANT CHANGE UP (ref -2–2)
BASOPHILS # BLD AUTO: 0.04 K/UL — SIGNIFICANT CHANGE UP (ref 0–0.2)
BASOPHILS NFR BLD AUTO: 0.4 % — SIGNIFICANT CHANGE UP (ref 0–1)
BILIRUB DIRECT SERPL-MCNC: <0.2 MG/DL — SIGNIFICANT CHANGE UP (ref 0–0.2)
BILIRUB INDIRECT FLD-MCNC: >0.3 MG/DL — SIGNIFICANT CHANGE UP (ref 0.2–1.2)
BILIRUB SERPL-MCNC: 0.5 MG/DL — SIGNIFICANT CHANGE UP (ref 0.2–1.2)
BILIRUB UR-MCNC: NEGATIVE — SIGNIFICANT CHANGE UP
BUN SERPL-MCNC: 12 MG/DL — SIGNIFICANT CHANGE UP (ref 10–20)
CALCIUM SERPL-MCNC: 9.9 MG/DL — SIGNIFICANT CHANGE UP (ref 8.5–10.1)
CHLORIDE SERPL-SCNC: 104 MMOL/L — SIGNIFICANT CHANGE UP (ref 98–110)
CO2 SERPL-SCNC: 23 MMOL/L — SIGNIFICANT CHANGE UP (ref 17–32)
COLOR SPEC: COLORLESS — SIGNIFICANT CHANGE UP
CREAT SERPL-MCNC: 1 MG/DL — SIGNIFICANT CHANGE UP (ref 0.7–1.5)
DIFF PNL FLD: NEGATIVE — SIGNIFICANT CHANGE UP
EOSINOPHIL # BLD AUTO: 0.09 K/UL — SIGNIFICANT CHANGE UP (ref 0–0.7)
EOSINOPHIL NFR BLD AUTO: 0.9 % — SIGNIFICANT CHANGE UP (ref 0–8)
GLUCOSE SERPL-MCNC: 183 MG/DL — HIGH (ref 70–99)
GLUCOSE UR QL: NEGATIVE — SIGNIFICANT CHANGE UP
HCO3 BLDV-SCNC: 25 MMOL/L — SIGNIFICANT CHANGE UP (ref 22–29)
HCT VFR BLD CALC: 49.5 % — SIGNIFICANT CHANGE UP (ref 42–52)
HGB BLD-MCNC: 16 G/DL — SIGNIFICANT CHANGE UP (ref 14–18)
IMM GRANULOCYTES NFR BLD AUTO: 1.4 % — HIGH (ref 0.1–0.3)
INR BLD: 0.97 RATIO — SIGNIFICANT CHANGE UP (ref 0.65–1.3)
KETONES UR-MCNC: NEGATIVE — SIGNIFICANT CHANGE UP
LACTATE BLDV-MCNC: 2.3 MMOL/L — HIGH (ref 0.5–1.6)
LACTATE SERPL-SCNC: 1.9 MMOL/L — SIGNIFICANT CHANGE UP (ref 0.7–2)
LEUKOCYTE ESTERASE UR-ACNC: NEGATIVE — SIGNIFICANT CHANGE UP
LIDOCAIN IGE QN: 17 U/L — SIGNIFICANT CHANGE UP (ref 7–60)
LYMPHOCYTES # BLD AUTO: 1.58 K/UL — SIGNIFICANT CHANGE UP (ref 1.2–3.4)
LYMPHOCYTES # BLD AUTO: 15.8 % — LOW (ref 20.5–51.1)
MCHC RBC-ENTMCNC: 27.1 PG — SIGNIFICANT CHANGE UP (ref 27–31)
MCHC RBC-ENTMCNC: 32.3 G/DL — SIGNIFICANT CHANGE UP (ref 32–37)
MCV RBC AUTO: 83.9 FL — SIGNIFICANT CHANGE UP (ref 80–94)
MONOCYTES # BLD AUTO: 0.41 K/UL — SIGNIFICANT CHANGE UP (ref 0.1–0.6)
MONOCYTES NFR BLD AUTO: 4.1 % — SIGNIFICANT CHANGE UP (ref 1.7–9.3)
NEUTROPHILS # BLD AUTO: 7.72 K/UL — HIGH (ref 1.4–6.5)
NEUTROPHILS NFR BLD AUTO: 77.4 % — HIGH (ref 42.2–75.2)
NITRITE UR-MCNC: NEGATIVE — SIGNIFICANT CHANGE UP
NRBC # BLD: 0 /100 WBCS — SIGNIFICANT CHANGE UP (ref 0–0)
PCO2 BLDV: 46 MMHG — SIGNIFICANT CHANGE UP (ref 41–51)
PH BLDV: 7.35 — SIGNIFICANT CHANGE UP (ref 7.26–7.43)
PH UR: 7.5 — SIGNIFICANT CHANGE UP (ref 5–8)
PLATELET # BLD AUTO: 186 K/UL — SIGNIFICANT CHANGE UP (ref 130–400)
PO2 BLDV: 68 MMHG — HIGH (ref 20–40)
POTASSIUM SERPL-MCNC: 4.4 MMOL/L — SIGNIFICANT CHANGE UP (ref 3.5–5)
POTASSIUM SERPL-SCNC: 4.4 MMOL/L — SIGNIFICANT CHANGE UP (ref 3.5–5)
PROT SERPL-MCNC: 7.2 G/DL — SIGNIFICANT CHANGE UP (ref 6–8)
PROT UR-MCNC: NEGATIVE — SIGNIFICANT CHANGE UP
PROTHROM AB SERPL-ACNC: 11.2 SEC — SIGNIFICANT CHANGE UP (ref 9.95–12.87)
RBC # BLD: 5.9 M/UL — SIGNIFICANT CHANGE UP (ref 4.7–6.1)
RBC # FLD: 12.7 % — SIGNIFICANT CHANGE UP (ref 11.5–14.5)
SAO2 % BLDV: 93 % — SIGNIFICANT CHANGE UP
SODIUM SERPL-SCNC: 142 MMOL/L — SIGNIFICANT CHANGE UP (ref 135–146)
SP GR SPEC: 1.03 — HIGH (ref 1.01–1.02)
TROPONIN T SERPL-MCNC: <0.01 NG/ML — SIGNIFICANT CHANGE UP
UROBILINOGEN FLD QL: SIGNIFICANT CHANGE UP
WBC # BLD: 9.98 K/UL — SIGNIFICANT CHANGE UP (ref 4.8–10.8)
WBC # FLD AUTO: 9.98 K/UL — SIGNIFICANT CHANGE UP (ref 4.8–10.8)

## 2020-07-30 PROCEDURE — 36000 PLACE NEEDLE IN VEIN: CPT

## 2020-07-30 PROCEDURE — 99285 EMERGENCY DEPT VISIT HI MDM: CPT | Mod: 25

## 2020-07-30 PROCEDURE — 70498 CT ANGIOGRAPHY NECK: CPT | Mod: 26

## 2020-07-30 PROCEDURE — 99223 1ST HOSP IP/OBS HIGH 75: CPT

## 2020-07-30 PROCEDURE — 93010 ELECTROCARDIOGRAM REPORT: CPT | Mod: 77

## 2020-07-30 PROCEDURE — 99222 1ST HOSP IP/OBS MODERATE 55: CPT

## 2020-07-30 PROCEDURE — 70450 CT HEAD/BRAIN W/O DYE: CPT | Mod: 26,59

## 2020-07-30 PROCEDURE — 70496 CT ANGIOGRAPHY HEAD: CPT | Mod: 26

## 2020-07-30 PROCEDURE — 71045 X-RAY EXAM CHEST 1 VIEW: CPT | Mod: 26

## 2020-07-30 PROCEDURE — 93010 ELECTROCARDIOGRAM REPORT: CPT

## 2020-07-30 RX ORDER — AMLODIPINE BESYLATE 2.5 MG/1
5 TABLET ORAL DAILY
Refills: 0 | Status: DISCONTINUED | OUTPATIENT
Start: 2020-07-30 | End: 2020-08-01

## 2020-07-30 RX ORDER — ACETAMINOPHEN 500 MG
975 TABLET ORAL ONCE
Refills: 0 | Status: COMPLETED | OUTPATIENT
Start: 2020-07-30 | End: 2020-07-30

## 2020-07-30 RX ORDER — SODIUM CHLORIDE 9 MG/ML
1000 INJECTION INTRAMUSCULAR; INTRAVENOUS; SUBCUTANEOUS
Refills: 0 | Status: DISCONTINUED | OUTPATIENT
Start: 2020-07-30 | End: 2020-08-01

## 2020-07-30 RX ORDER — ONDANSETRON 8 MG/1
4 TABLET, FILM COATED ORAL ONCE
Refills: 0 | Status: COMPLETED | OUTPATIENT
Start: 2020-07-30 | End: 2020-07-30

## 2020-07-30 RX ORDER — OXYCODONE AND ACETAMINOPHEN 5; 325 MG/1; MG/1
1 TABLET ORAL ONCE
Refills: 0 | Status: DISCONTINUED | OUTPATIENT
Start: 2020-07-30 | End: 2020-07-30

## 2020-07-30 RX ORDER — ACETAMINOPHEN 500 MG
650 TABLET ORAL EVERY 6 HOURS
Refills: 0 | Status: DISCONTINUED | OUTPATIENT
Start: 2020-07-30 | End: 2020-08-01

## 2020-07-30 RX ORDER — MORPHINE SULFATE 50 MG/1
4 CAPSULE, EXTENDED RELEASE ORAL ONCE
Refills: 0 | Status: DISCONTINUED | OUTPATIENT
Start: 2020-07-30 | End: 2020-07-30

## 2020-07-30 RX ORDER — SODIUM CHLORIDE 9 MG/ML
1000 INJECTION INTRAMUSCULAR; INTRAVENOUS; SUBCUTANEOUS ONCE
Refills: 0 | Status: COMPLETED | OUTPATIENT
Start: 2020-07-30 | End: 2020-07-30

## 2020-07-30 RX ORDER — METOCLOPRAMIDE HCL 10 MG
10 TABLET ORAL ONCE
Refills: 0 | Status: COMPLETED | OUTPATIENT
Start: 2020-07-30 | End: 2020-07-30

## 2020-07-30 RX ORDER — KETOROLAC TROMETHAMINE 30 MG/ML
30 SYRINGE (ML) INJECTION EVERY 8 HOURS
Refills: 0 | Status: DISCONTINUED | OUTPATIENT
Start: 2020-07-30 | End: 2020-07-31

## 2020-07-30 RX ADMIN — MORPHINE SULFATE 4 MILLIGRAM(S): 50 CAPSULE, EXTENDED RELEASE ORAL at 21:13

## 2020-07-30 RX ADMIN — Medication 650 MILLIGRAM(S): at 23:57

## 2020-07-30 RX ADMIN — SODIUM CHLORIDE 1000 MILLILITER(S): 9 INJECTION INTRAMUSCULAR; INTRAVENOUS; SUBCUTANEOUS at 17:35

## 2020-07-30 RX ADMIN — SODIUM CHLORIDE 1000 MILLILITER(S): 9 INJECTION INTRAMUSCULAR; INTRAVENOUS; SUBCUTANEOUS at 15:39

## 2020-07-30 RX ADMIN — MORPHINE SULFATE 4 MILLIGRAM(S): 50 CAPSULE, EXTENDED RELEASE ORAL at 22:26

## 2020-07-30 RX ADMIN — Medication 10 MILLIGRAM(S): at 16:20

## 2020-07-30 RX ADMIN — Medication 30 MILLIGRAM(S): at 23:54

## 2020-07-30 RX ADMIN — SODIUM CHLORIDE 100 MILLILITER(S): 9 INJECTION INTRAMUSCULAR; INTRAVENOUS; SUBCUTANEOUS at 22:23

## 2020-07-30 RX ADMIN — SODIUM CHLORIDE 100 MILLILITER(S): 9 INJECTION INTRAMUSCULAR; INTRAVENOUS; SUBCUTANEOUS at 21:18

## 2020-07-30 RX ADMIN — ONDANSETRON 4 MILLIGRAM(S): 8 TABLET, FILM COATED ORAL at 15:39

## 2020-07-30 RX ADMIN — Medication 10 MILLIGRAM(S): at 22:23

## 2020-07-30 RX ADMIN — ONDANSETRON 4 MILLIGRAM(S): 8 TABLET, FILM COATED ORAL at 17:54

## 2020-07-30 RX ADMIN — SODIUM CHLORIDE 100 MILLILITER(S): 9 INJECTION INTRAMUSCULAR; INTRAVENOUS; SUBCUTANEOUS at 21:06

## 2020-07-30 NOTE — ED PROVIDER NOTE - PHYSICAL EXAMINATION
Physical Exam    Vital Signs: I have reviewed the initial vital signs  Constitutional: ill-appearing, vomiting off side of stretcher  EENT: Conjunctiva pink, Sclera clear, PERRLA, EOMI. Mucous membranes moist, no exudates or lesions noted, uvula midline.  Cardiovascular: S1 and S2 present, regular rate, regular rhythm. Well perfused extremities, no peripheral edema  Respiratory: unlabored respiratory effort, clear to auscultation bilaterally no wheezing, rales or rhonchi  Gastrointestinal: soft, non-tender abdomen. No guarding or rebound tenderness  Musculoskeletal: supple nontender neck, no midline tenderness, no joint pain  Integumentary: warm, dry, no rash  Neurologic: A & O x 3, CN II-XII grossly intact, all extremities’ motor and sensory functions grossly intact. Finger to nose, visual fields, and rapid alternating movements intact. unable to ambulate.  Psychiatric: appropriate mood, appropriate affect

## 2020-07-30 NOTE — ED PROVIDER NOTE - PROGRESS NOTE DETAILS
Pt HCT done- no ICH read by me. Pending CT angio head/neck. PA cancelled the stroke alert while in the CT- CT tech cancelled Ct angio head/neck PA notified me that she spoke to her attending and he would like the pt to received Ct angio head/neck. Since the original was cancelled will have to re-order. [facility] was notified by CT that they cancelled my 2nd order for CT angio head/neck. I ordered it a 3rd time and explicitly told them that cancelling my orders is delaying pt care who could be having a posterior circulation stroke. spoke with neuro - recs for admission for mri JR: PA cancelled the stroke alert while in the CT- CT tech cancelled Ct angio head/neck JR: Pt HCT done- no ICH read by me. Pending CT angio head/neck. JR: PA notified me that she spoke to her attending and he would like the pt to received Ct angio head/neck. Since the original was cancelled will have to re-order. [facility] JR: was notified by CT that they cancelled my 2nd order for CT angio head/neck. I ordered it a 3rd time and explicitly told them that cancelling my orders is delaying pt care who could be having a posterior circulation stroke.

## 2020-07-30 NOTE — H&P ADULT - NSHPPHYSICALEXAM_GEN_ALL_CORE
GENERAL: Not in distress   SKIN: warm, no acute rash.  HEAD: Normocephalic; atraumatic.  EYES: PERRL, 4 mm bilateral, EOM intact; conjunctiva and sclera clear.  NECK: Supple; non tender. no enlarged L.N .   CARD: Regular rate and rhythm. no murmurs   RESP: air entry bilaterally, No wheezes, rales or rhonchi.   ABD: soft; non-distended; non-tender. No Rebound, No Guarding, No CVA tenderness.  EXT: no edema or ulcers   NEURO: alert and oriented x3, CN 2-12 intact, normal finger to nose, intact motor and sensation over the face and all extremities GENERAL: in mild distress with headache  SKIN: warm, no acute rash.  HEAD: Normocephalic; atraumatic.  EYES: PERRL, 4 mm bilateral, EOM intact; conjunctiva and sclera clear.  NECK: Supple; non tender. no enlarged L.N .   CARD: Regular rate and rhythm. no murmurs   RESP: air entry bilaterally, No wheezes, rales or rhonchi.   ABD: soft; non-distended; non-tender. No Rebound, No Guarding, No CVA tenderness.  EXT: no edema or ulcers   NEURO: alert and oriented x3, CN 2-12 intact, normal finger to nose, intact motor and sensation over the face and all extremities

## 2020-07-30 NOTE — H&P ADULT - ATTENDING COMMENTS
Patient seen and examined at bedside. Not in acute distress.   Agree with the assessment and plan above.   Please note the changes below.    # Headache and Dizziness secondary to Hypertensive Urgency vs Tension headache vs ?CVA   - bp elevated 170s/90s  - pt AAOx3, non focal; complaining of b/l frontal headache  - CTH Bilateral basal ganglia lacunar infarcts of indeterminate age, negative for bleeding  - CT Angio: no evidence of major vascular stenosis, occlusion, or aneurysm  - c/w Zofran   - start amlodipine 5mg --> more aggressive bp control once cleared by neurology   - Tylenol for headache  - check HbA1C, lipid panel   - check head MRI non contrast   - check EKG  - neurology consult    # Full code

## 2020-07-30 NOTE — ED PROVIDER NOTE - ATTENDING CONTRIBUTION TO CARE
Pt with PMH HTN not on medication presents to [facility] for acute onset L sided headache that started at 2pm, associated with dizziness and vomiting. He reports he has never had an episode like this before. Denies focal weakness or numbness, vision changes.     CONSTITUTIONAL: Pt curled in a ball, vomiting in a bag. Cooperative  throughout exam.   SKIN: skin exam is warm and dry, no acute rash.  HEAD: Normocephalic; atraumatic.  EYES: PERRL, 3 mm bilateral, no nystagmus, EOM intact; conjunctiva and sclera clear.  ENT: No nasal discharge; airway clear.  NECK: Supple; non tender. + full passive ROM in all directions.   CARD: Regular rate and rhythm. + Symmetric Strong Pulses  RESP: No wheezes, rales or rhonchi. Good air movement bilaterally  ABD: soft; non-distended; non-tender. No Rebound, No Guarding, No signs of peritonitis, No CVA tenderness. No pulsatile abdominal mass. + Strong and Symmetric Pulses  EXT: Normal ROM. No clubbing, cyanosis or edema. Dp and Pt Pulses intact. Cap refill less than 3 seconds  NEURO: CN 2-12 intact, normal finger to nose, no sensory or motor deficits, Alert, oriented, grossly unremarkable. No Focal deficits. GCS 15. NIH 0  PSYCH: Cooperative, appropriate.    P: eval for posterior cerebellar stroke with CT brain, CT angio/head neck, VSS, frequent neuro checks, Pt with PMH HTN not on medication presents to [facility] for acute onset severe L sided headache that started at 2pm, associated with dizziness and vomiting. He reports he has never had an episode like this before. Denies focal weakness or numbness, vision changes. Not on ASA. Stroke alert immediately called.    CONSTITUTIONAL: Pt curled in a ball, vomiting in a bag. Cooperative  throughout exam.   SKIN: skin exam is warm and dry, no acute rash.  HEAD: Normocephalic; atraumatic.  EYES: PERRL, 3 mm bilateral, no nystagmus, EOM intact; conjunctiva and sclera clear.  ENT: No nasal discharge; airway clear.  NECK: Supple; non tender. + full passive ROM in all directions.   CARD: Regular rate and rhythm. + Symmetric Strong Pulses  RESP: No wheezes, rales or rhonchi. Good air movement bilaterally  ABD: soft; non-distended; non-tender. No Rebound, No Guarding, No signs of peritonitis, No CVA tenderness. No pulsatile abdominal mass. + Strong and Symmetric Pulses  EXT: Normal ROM. No clubbing, cyanosis or edema. Dp and Pt Pulses intact. Cap refill less than 3 seconds  NEURO: CN 2-12 intact, normal finger to nose, no sensory or motor deficits, Alert, oriented, grossly unremarkable. No Focal deficits. GCS 15. NIH 0  PSYCH: Cooperative, appropriate.    P: eval for posterior circulation CVA with CT brain, CT angio/head neck, VSS, frequent neuro checks, and will reassess.

## 2020-07-30 NOTE — ED PROVIDER NOTE - CLINICAL SUMMARY MEDICAL DECISION MAKING FREE TEXT BOX
Pt with PMH HTN presented to [facility] for acute onset severe left-sided headache with dizziness and vomiting. NIH 0. Stroke alert called for concern for acute posterior circulation CVA. NP cancelled the stroke alert and cancelled the CT angio while in the cat scanner. I re-ordered, and those were cancelled as well. Non-contrast HCT showed b/l basilar lacunar infarcts of indeterminate age. CT angio head/neck wnl. Dr. Márquez determined that no lvo and no indication for neurointervention at 17:15. Pt admitted for MRI ro CVA. HA improved with reglan and IVF. Pt with PMH HTN presented to [facility] for acute onset severe left-sided headache with dizziness and vomiting. NIH 0. Stroke alert called for concern for acute posterior circulation CVA. NP cancelled the stroke alert and cancelled the CT angio while in the cat scanner. I re-ordered, and those were cancelled as well. Non-contrast HCT showed b/l basilar lacunar infarcts of indeterminate age. CT angio head/neck wnl. Dr. Márquez determined that no lvo and no indication for neurointervention at 17:15, and no indications for tpa. Pt admitted for MRI ro CVA. HA improved with reglan and IVF.

## 2020-07-30 NOTE — ED PROVIDER NOTE - CARE PLAN
Principal Discharge DX:	Headache  Secondary Diagnosis:	Nausea and vomiting  Secondary Diagnosis:	Lacunar infarction

## 2020-07-30 NOTE — ED PROVIDER NOTE - OBJECTIVE STATEMENT
59 year old male hx htn? p/w n/v/dizziness/headache. Abrupt onset at 2:00 pm as per daughter. patient began feeling nauseous, dizzy like room spinning and vomiting ~ 20 times. Pt denies visual changes, numbness, weakness. HA is left sided, severe, worsening, no pall/prov factors. No fevers, chest pain, cough, shortness of breath, abd pain, dysuria.

## 2020-07-30 NOTE — STROKE CODE NOTE - DISPOSITION
Other/patient has no focal deficits or meningeal signs,if headache persists despite of medications and IVF obtain MRI brain NC and consult gen neurology

## 2020-07-30 NOTE — ED PROVIDER NOTE - NS ED ROS FT
Review of Systems         Constitutional: (-) fever (-) chills (-) weakness       Head: (-) trauma       EENT: (-) visual changes (-) sore throat (-) congestion       Cardiovascular: (-) chest pain (-) syncope       Respiratory: (-) cough, (-) shortness of breath       Gastrointestinal: See HPI       Genitourinary: (-) dysuria (-) frequency (-) hematuria       Musculoskeletal: (-) neck pain (-) back pain (-) joint pain       Integumentary: (-) rash       Neurological: (+) headache (-) altered mental status (+) dizziness (-) paresthesias       Allergic/Immunologic: (-) pruritus       Psych: (-) psych history

## 2020-07-30 NOTE — ED ADULT NURSE NOTE - OBJECTIVE STATEMENT
Patient is a 59 year old male coming in from home for nausea and vomiting since this AM. Patient has been fasting since midnight last night and has progressively been more lethargic as the day went on.

## 2020-07-30 NOTE — H&P ADULT - HISTORY OF PRESENT ILLNESS
59 yr M pt with no PMHx was brought by his daughter due to headache and NBNB vomiting.    pt was doing fine till 2 pm today, after coming back from his office, his daughter came to pick him up and he told her hef eels dizzy,  pt had vomiting right after he sat on the car, followed by another 3x NBNB vomiting so daughter decided to bring him to ER,  during their way pt started feeling of left sided headache, sharp, constant, no radiation, 10/10,   of note pt is fasting today for Church holiday,   Pt denies numbness, weakness, neck stiffness, visual changes, No fevers, chest pain, cough, shortness of breath, abd pain, dysuria.    In ED: stroke called , NIHS 0, CTH showed Bilateral basal ganglia lacunar infarcts of indeterminate age,   CT Angio  No evidence of major vascular stenosis, occlusion, or aneurysm.  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  Incidentally noted is bilateral cervical lymphadenopathy,   pt admitted for further evaluation 59 yr M pt with no PMHx was brought by his daughter due to headache and NBNB vomiting.    pt was doing fine till 2 pm today, after coming back from his office, his daughter came to pick him up and he told her he was feeling dizzy,  pt had vomiting right after he sat on the car, followed by another 3x NBNB vomiting so daughter decided to bring him to ER,  during their way pt started feeling of left sided headache, sharp, constant, no radiation, 10/10,   of note pt is fasting today for [employer] holiday,   Pt denies numbness, weakness, neck stiffness, visual changes, No fevers, chest pain, cough, shortness of breath, abd pain, dysuria.    In ED: stroke called , NIHS 0,   CTH showed Bilateral basal ganglia lacunar infarcts of indeterminate age,   CT Angio  No evidence of major vascular stenosis, occlusion, or aneurysm.  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  Incidentally noted is bilateral cervical lymphadenopathy,   pt admitted for further evaluation

## 2020-07-30 NOTE — H&P ADULT - ASSESSMENT
59 yr M pt with no PMHx was brought by his daughter due to headache and NBNB vomiting.      #acute severe headache with persistent vomiting  - CTH Bilateral basal ganglia lacunar infarcts of indeterminate age, negative for bleeding,  -  no focal deficit on exam  - CT Angio: no evidence of major vascular stenosis, occlusion, or aneurysm.  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  - Hypertensive in ED, currently 140s, unknown cause for now, proceed with head MRI non contrast for further evaluation   - neuro evaluation  - zofran PRN, IVF 100cc/hr, pain med PRN    #HTN: monitor off medication for now    #bilateral cervical lymphadenopathy  - 1.5 x 1.2 cm lymph node within the right parotid gland with enlarged R submandibular, No gross evidence of mass or inflammatory  - no Hx of recurrent fever, night sweat or chills,   - further monitor and evaluation as outpt     #DVT por: Lovenox   #GI pro: PPI  #Diet: CLL if tolerated   Dispo: from home 59 yr M pt with no PMHx was brought by his daughter due to headache and NBNB vomiting.      #acute severe headache with persistent vomiting  - CTH Bilateral basal ganglia lacunar infarcts of indeterminate age, negative for bleeding,  -  no focal deficit on exam  - CT Angio: no evidence of major vascular stenosis, occlusion, or aneurysm.  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  - could be HTN urgency, in ED SBP 180s, unknown if has Hx of longstanding HTN,proceed with head MRI non contrast for further evaluation   - neuro evaluation  - zofran PRN, IVF 100cc/hr, pain med PRN    #HTN: unknown if he has chronic Hx of uncontrolled HTN or not, monitor off medication for now    #bilateral cervical lymphadenopathy  - 1.5 x 1.2 cm lymph node within the right parotid gland with enlarged R submandibular, No gross evidence of mass or inflammatory  - no Hx of recurrent fever, night sweat or chills,   - further monitor and evaluation as outpt     #DVT por: Lovenox   #GI pro: PPI  #Diet: CLL if tolerated   Dispo: from home 59 yr M pt with no PMHx was brought by his daughter due to headache and NBNB vomiting.      #acute severe headache with persistent vomiting  - CTH Bilateral basal ganglia lacunar infarcts of indeterminate age, negative for bleeding,  -  no focal deficit on exam  - CT Angio: no evidence of major vascular stenosis, occlusion, or aneurysm.  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  - could be HTN urgency, in ED SBP 180s, unknown if has Hx of longstanding HTN, migraine, tension headache, proceed with head MRI non contrast for further evaluation   - neuro evaluation  - zofran PRN, IVF 100cc/hr, pain med PRN    #HTN: unknown if he has chronic Hx of uncontrolled HTN or not, monitor off medication for now    #bilateral cervical lymphadenopathy  - 1.5 x 1.2 cm lymph node within the right parotid gland with enlarged R submandibular, No gross evidence of mass or inflammatory  - no Hx of recurrent fever, night sweat or chills,   - further monitor and evaluation as outpt     #DVT por: Lovenox   #GI pro: PPI  #Diet: CLL if tolerated   Dispo: from home 59 yr M pt with no PMHx was brought by his daughter due to headache and NBNB vomiting.      #acute severe headache with persistent vomiting  - CTH Bilateral basal ganglia lacunar infarcts of indeterminate age, negative for bleeding,  -  no focal deficit on exam  - CT Angio: no evidence of major vascular stenosis, occlusion, or aneurysm.  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  - could be HTN urgency, in ED SBP 180s, unknown if has Hx of longstanding HTN, migraine or cluster headache, proceed with head MRI non contrast for further evaluation   - neuro evaluation  - zofran PRN, IVF 100cc/hr, pain med PRN    #HTN: unknown if he has chronic Hx of uncontrolled HTN or not, monitor off medication for now    #bilateral cervical lymphadenopathy  - 1.5 x 1.2 cm lymph node within the right parotid gland with enlarged R submandibular, No gross evidence of mass or inflammatory  - no Hx of recurrent fever, night sweat or chills,   - further monitor and evaluation as outpt     #DVT por: Lovenox   #GI pro: PPI  #Diet: CLL if tolerated   Dispo: from home 59 yr M pt with no PMHx was brought by his daughter due to left sided headache and NBNB vomiting.      #acute severe headache with persistent vomiting: had 3NBNB vomtiing in ED and still in severe headache   - CTH Bilateral basal ganglia lacunar infarcts of indeterminate age, negative for bleeding, no focal deficit on exam  - CT Angio: no evidence of major vascular stenosis, occlusion, or aneurysm.  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    - DDx" HTN urgency, in ED SBP 180s, (although unknown if has Hx of longstanding HTN), migraine or cluster headache,   - Brain MRI, EKG, gently start anti HTN amlodpine 5mg, more BP control once cleared by neuro  - f/u with neuro   - zofran PRN, IVF 100cc/hr, tylenol q6h,. Toradol PRN     #HTN: unknown if he has chronic Hx of uncontrolled HTN or not,   - Get EKG, Echo, start amlodpine 5mg, mroe BP control once cleared bu neuro     #bilateral cervical lymphadenopathy  - 1.5 x 1.2 cm lymph node within the right parotid gland with enlarged R submandibular, No gross evidence of mass or inflammatory  - no Hx of recurrent fever, night sweat or chills,   - further monitor and evaluation as outpt     #DVT por: Lovenox   #GI pro: PPI  #Diet: CLL if tolerated   Dispo: from home

## 2020-07-30 NOTE — H&P ADULT - NSHPLABSRESULTS_GEN_ALL_CORE
LABS:                        16.0   9.98  )-----------( 186      ( 30 Jul 2020 16:00 )             49.5     30 Jul 2020 16:00    142    |  104    |  12     ----------------------------<  183    4.4     |  23     |  1.0      Ca    9.9        30 Jul 2020 16:00    TPro  7.2    /  Alb  5.0    /  TBili  0.5    /  DBili  <0.2   /  AST  22     /  ALT  14     /  AlkPhos  65     30 Jul 2020 16:00    PT/INR - ( 30 Jul 2020 16:00 )   PT: 11.20 sec;   INR: 0.97 ratio         PTT - ( 30 Jul 2020 16:00 )  PTT:25.9 sec  < from: CT Angio Neck w/ IV Cont (07.30.20 @ 17:04) >      IMPRESSION:      No evidence of major vascular stenosis, occlusion, or aneurysm.    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    Incidentally noted is bilateral cervical lymphadenopathy, predominantly level 1 nodes. There is a prominent 1.5 x 1.2 cm lymph node within the right parotid gland. The parotid glands themselves are of normal size and attenuation. The right submandibular gland is noted to be larger than the left, however both submandibular glands are normal in attenuation. No gross evidence of mass or inflammatory process within the aerodigestive tract. Further evaluation should rest upon clinical grounds.    < end of copied text >  < from: CT Brain Stroke Protocol (07.30.20 @ 15:43) >    IMPRESSION:    1.  No evidence of acute intracranial hemorrhage or large territory infarct.    2.  Bilateral basal ganglia lacunar infarcts of indeterminate age.    < end of copied text >

## 2020-07-30 NOTE — ED ADULT NURSE NOTE - NSIMPLEMENTINTERV_GEN_ALL_ED
Implemented All Fall with Harm Risk Interventions:  [place] to call system. Call bell, personal items and telephone within reach. Instruct patient to call for assistance. Room bathroom lighting operational. Non-slip footwear when patient is off stretcher. Physically safe environment: no spills, clutter or unnecessary equipment. Stretcher in lowest position, wheels locked, appropriate side rails in place. Provide visual cue, wrist band, yellow gown, etc. Monitor gait and stability. Monitor for mental status changes and reorient to person, place, and time. Review medications for side effects contributing to fall risk. Reinforce activity limits and safety measures with patient and family. Provide visual clues: red socks.

## 2020-07-31 ENCOUNTER — TRANSCRIPTION ENCOUNTER (OUTPATIENT)
Age: 60
End: 2020-07-31

## 2020-07-31 LAB
A1C WITH ESTIMATED AVERAGE GLUCOSE RESULT: 5.9 % — HIGH (ref 4–5.6)
ALBUMIN SERPL ELPH-MCNC: 4 G/DL — SIGNIFICANT CHANGE UP (ref 3.5–5.2)
ALP SERPL-CCNC: 42 U/L — SIGNIFICANT CHANGE UP (ref 30–115)
ALT FLD-CCNC: 10 U/L — SIGNIFICANT CHANGE UP (ref 0–41)
ANION GAP SERPL CALC-SCNC: 11 MMOL/L — SIGNIFICANT CHANGE UP (ref 7–14)
AST SERPL-CCNC: 17 U/L — SIGNIFICANT CHANGE UP (ref 0–41)
BASOPHILS # BLD AUTO: 0.01 K/UL — SIGNIFICANT CHANGE UP (ref 0–0.2)
BASOPHILS NFR BLD AUTO: 0.1 % — SIGNIFICANT CHANGE UP (ref 0–1)
BILIRUB SERPL-MCNC: 0.4 MG/DL — SIGNIFICANT CHANGE UP (ref 0.2–1.2)
BUN SERPL-MCNC: 8 MG/DL — LOW (ref 10–20)
CALCIUM SERPL-MCNC: 8 MG/DL — LOW (ref 8.5–10.1)
CHLORIDE SERPL-SCNC: 106 MMOL/L — SIGNIFICANT CHANGE UP (ref 98–110)
CHOLEST SERPL-MCNC: 197 MG/DL — SIGNIFICANT CHANGE UP (ref 100–200)
CO2 SERPL-SCNC: 23 MMOL/L — SIGNIFICANT CHANGE UP (ref 17–32)
CREAT SERPL-MCNC: 0.6 MG/DL — LOW (ref 0.7–1.5)
CULTURE RESULTS: SIGNIFICANT CHANGE UP
EOSINOPHIL # BLD AUTO: 0 K/UL — SIGNIFICANT CHANGE UP (ref 0–0.7)
EOSINOPHIL NFR BLD AUTO: 0 % — SIGNIFICANT CHANGE UP (ref 0–8)
ESTIMATED AVERAGE GLUCOSE: 123 MG/DL — HIGH (ref 68–114)
GLUCOSE SERPL-MCNC: 119 MG/DL — HIGH (ref 70–99)
HCT VFR BLD CALC: 42.2 % — SIGNIFICANT CHANGE UP (ref 42–52)
HCV AB S/CO SERPL IA: 0.04 COI — SIGNIFICANT CHANGE UP
HCV AB SERPL-IMP: SIGNIFICANT CHANGE UP
HDLC SERPL-MCNC: 60 MG/DL — SIGNIFICANT CHANGE UP
HGB BLD-MCNC: 13.8 G/DL — LOW (ref 14–18)
IMM GRANULOCYTES NFR BLD AUTO: 0.3 % — SIGNIFICANT CHANGE UP (ref 0.1–0.3)
LIPID PNL WITH DIRECT LDL SERPL: 128 MG/DL — SIGNIFICANT CHANGE UP (ref 4–129)
LYMPHOCYTES # BLD AUTO: 0.7 K/UL — LOW (ref 1.2–3.4)
LYMPHOCYTES # BLD AUTO: 8.9 % — LOW (ref 20.5–51.1)
MAGNESIUM SERPL-MCNC: 1.7 MG/DL — LOW (ref 1.8–2.4)
MCHC RBC-ENTMCNC: 27.4 PG — SIGNIFICANT CHANGE UP (ref 27–31)
MCHC RBC-ENTMCNC: 32.7 G/DL — SIGNIFICANT CHANGE UP (ref 32–37)
MCV RBC AUTO: 83.9 FL — SIGNIFICANT CHANGE UP (ref 80–94)
MONOCYTES # BLD AUTO: 0.39 K/UL — SIGNIFICANT CHANGE UP (ref 0.1–0.6)
MONOCYTES NFR BLD AUTO: 5 % — SIGNIFICANT CHANGE UP (ref 1.7–9.3)
NEUTROPHILS # BLD AUTO: 6.71 K/UL — HIGH (ref 1.4–6.5)
NEUTROPHILS NFR BLD AUTO: 85.7 % — HIGH (ref 42.2–75.2)
NRBC # BLD: 0 /100 WBCS — SIGNIFICANT CHANGE UP (ref 0–0)
PLATELET # BLD AUTO: 174 K/UL — SIGNIFICANT CHANGE UP (ref 130–400)
POTASSIUM SERPL-MCNC: 3.9 MMOL/L — SIGNIFICANT CHANGE UP (ref 3.5–5)
POTASSIUM SERPL-SCNC: 3.9 MMOL/L — SIGNIFICANT CHANGE UP (ref 3.5–5)
PROT SERPL-MCNC: 5.8 G/DL — LOW (ref 6–8)
RBC # BLD: 5.03 M/UL — SIGNIFICANT CHANGE UP (ref 4.7–6.1)
RBC # FLD: 12.7 % — SIGNIFICANT CHANGE UP (ref 11.5–14.5)
SARS-COV-2 RNA SPEC QL NAA+PROBE: SIGNIFICANT CHANGE UP
SODIUM SERPL-SCNC: 140 MMOL/L — SIGNIFICANT CHANGE UP (ref 135–146)
SPECIMEN SOURCE: SIGNIFICANT CHANGE UP
TOTAL CHOLESTEROL/HDL RATIO MEASUREMENT: 3.3 RATIO — LOW (ref 4–5.5)
TRIGL SERPL-MCNC: 47 MG/DL — SIGNIFICANT CHANGE UP (ref 10–149)
WBC # BLD: 7.83 K/UL — SIGNIFICANT CHANGE UP (ref 4.8–10.8)
WBC # FLD AUTO: 7.83 K/UL — SIGNIFICANT CHANGE UP (ref 4.8–10.8)

## 2020-07-31 PROCEDURE — 99222 1ST HOSP IP/OBS MODERATE 55: CPT

## 2020-07-31 PROCEDURE — 99233 SBSQ HOSP IP/OBS HIGH 50: CPT

## 2020-07-31 PROCEDURE — 93306 TTE W/DOPPLER COMPLETE: CPT | Mod: 26

## 2020-07-31 PROCEDURE — 70551 MRI BRAIN STEM W/O DYE: CPT | Mod: 26

## 2020-07-31 RX ORDER — ONDANSETRON 8 MG/1
4 TABLET, FILM COATED ORAL ONCE
Refills: 0 | Status: COMPLETED | OUTPATIENT
Start: 2020-07-31 | End: 2020-07-31

## 2020-07-31 RX ORDER — ATORVASTATIN CALCIUM 80 MG/1
80 TABLET, FILM COATED ORAL AT BEDTIME
Refills: 0 | Status: DISCONTINUED | OUTPATIENT
Start: 2020-07-31 | End: 2020-08-01

## 2020-07-31 RX ORDER — HEPARIN SODIUM 5000 [USP'U]/ML
5000 INJECTION INTRAVENOUS; SUBCUTANEOUS EVERY 8 HOURS
Refills: 0 | Status: DISCONTINUED | OUTPATIENT
Start: 2020-07-31 | End: 2020-08-01

## 2020-07-31 RX ORDER — ASPIRIN/CALCIUM CARB/MAGNESIUM 324 MG
1 TABLET ORAL
Qty: 0 | Refills: 0 | DISCHARGE
Start: 2020-07-31

## 2020-07-31 RX ORDER — MAGNESIUM SULFATE 500 MG/ML
2 VIAL (ML) INJECTION ONCE
Refills: 0 | Status: COMPLETED | OUTPATIENT
Start: 2020-07-31 | End: 2020-07-31

## 2020-07-31 RX ORDER — ATORVASTATIN CALCIUM 80 MG/1
1 TABLET, FILM COATED ORAL
Qty: 0 | Refills: 0 | DISCHARGE
Start: 2020-07-31

## 2020-07-31 RX ORDER — AMLODIPINE BESYLATE 2.5 MG/1
1 TABLET ORAL
Qty: 0 | Refills: 0 | DISCHARGE
Start: 2020-07-31

## 2020-07-31 RX ORDER — ASPIRIN/CALCIUM CARB/MAGNESIUM 324 MG
81 TABLET ORAL DAILY
Refills: 0 | Status: DISCONTINUED | OUTPATIENT
Start: 2020-07-31 | End: 2020-08-01

## 2020-07-31 RX ADMIN — Medication 30 MILLIGRAM(S): at 01:14

## 2020-07-31 RX ADMIN — SODIUM CHLORIDE 100 MILLILITER(S): 9 INJECTION INTRAMUSCULAR; INTRAVENOUS; SUBCUTANEOUS at 07:58

## 2020-07-31 RX ADMIN — ONDANSETRON 4 MILLIGRAM(S): 8 TABLET, FILM COATED ORAL at 01:57

## 2020-07-31 RX ADMIN — Medication 50 GRAM(S): at 12:26

## 2020-07-31 RX ADMIN — Medication 650 MILLIGRAM(S): at 23:27

## 2020-07-31 RX ADMIN — Medication 650 MILLIGRAM(S): at 01:15

## 2020-07-31 RX ADMIN — Medication 650 MILLIGRAM(S): at 05:00

## 2020-07-31 RX ADMIN — ATORVASTATIN CALCIUM 80 MILLIGRAM(S): 80 TABLET, FILM COATED ORAL at 21:37

## 2020-07-31 RX ADMIN — Medication 30 MILLIGRAM(S): at 07:58

## 2020-07-31 RX ADMIN — Medication 650 MILLIGRAM(S): at 12:56

## 2020-07-31 RX ADMIN — SODIUM CHLORIDE 100 MILLILITER(S): 9 INJECTION INTRAMUSCULAR; INTRAVENOUS; SUBCUTANEOUS at 20:38

## 2020-07-31 RX ADMIN — Medication 81 MILLIGRAM(S): at 12:27

## 2020-07-31 RX ADMIN — AMLODIPINE BESYLATE 5 MILLIGRAM(S): 2.5 TABLET ORAL at 00:02

## 2020-07-31 RX ADMIN — Medication 650 MILLIGRAM(S): at 12:26

## 2020-07-31 NOTE — PROGRESS NOTE ADULT - ASSESSMENT
59 yr M pt with no PMHx was brought by his daughter due to left sided headache and NBNB vomiting.    # Headache and Dizziness secondary to Hypertensive Urgency vs Tension headache vs ?CVA   - pt AAOx3, non focal; complaining of b/l frontal headache, EKG and trop normal  - CTH Bilateral basal ganglia lacunar infarcts of indeterminate age, negative for bleeding  - CT Angio: no evidence of major vascular stenosis, occlusion, or aneurysm  - c/w Zofran   - start amlodipine 5mg --> more aggressive bp control once cleared by neurology   - Tylenol for headache  - check HbA1C, lipid panel   - check head MRI non contrast , TTE  - neurology consult    # HTN: unknown if he has chronic Hx of uncontrolled HTN or not,   - Echo, start amlodpine 5mg, mroe BP control once cleared bu neuro     # bilateral cervical lymphadenopathy  - 1.5 x 1.2 cm lymph node within the right parotid gland with enlarged R submandibular, No gross evidence of mass or inflammatory  - no Hx of recurrent fever, night sweat or chills,   - further monitor and evaluation as outpt     #DVT por: Lovenox   #GI pro: PPI  #Diet: CLL if tolerated   Dispo: from home

## 2020-07-31 NOTE — PROGRESS NOTE ADULT - SUBJECTIVE AND OBJECTIVE BOX
SUBJECTIVE:    Patient is a 59y old Male who presents with a chief complaint of headache and vomiting (2020 10:36)    Currently admitted to medicine with the primary diagnosis of Headache     Today is hospital day 1d. This morning he is resting comfortably in bed and reports no new issues or overnight events.     PAST MEDICAL & SURGICAL HISTORY  HTN (hypertension): states sometimes  No significant past surgical history    SOCIAL HISTORY:  Negative for smoking/alcohol/drug use.     ALLERGIES:  No Known Allergies    MEDICATIONS:  STANDING MEDICATIONS  acetaminophen   Tablet .. 650 milliGRAM(s) Oral every 6 hours  amLODIPine   Tablet 5 milliGRAM(s) Oral daily  aspirin enteric coated 81 milliGRAM(s) Oral daily  atorvastatin 80 milliGRAM(s) Oral at bedtime  magnesium sulfate  IVPB 2 Gram(s) IV Intermittent once  sodium chloride 0.9%. 1000 milliLiter(s) IV Continuous <Continuous>    PRN MEDICATIONS    VITALS:   T(F): 98.7  HR: 80  BP: 142/65  RR: 18  SpO2: 97%    LABS:                        13.8   7.83  )-----------( 174      ( 2020 06:09 )             42.2         140  |  106  |  8<L>  ----------------------------<  119<H>  3.9   |  23  |  0.6<L>    Ca    8.0<L>      2020 06:09  Mg     1.7         TPro  5.8<L>  /  Alb  4.0  /  TBili  0.4  /  DBili  x   /  AST  17  /  ALT  10  /  AlkPhos  42      PT/INR - ( 2020 16:00 )   PT: 11.20 sec;   INR: 0.97 ratio         PTT - ( 2020 16:00 )  PTT:25.9 sec  Urinalysis Basic - ( 2020 16:54 )    Color: Colorless / Appearance: Clear / S.033 / pH: x  Gluc: x / Ketone: Negative  / Bili: Negative / Urobili: <2 mg/dL   Blood: x / Protein: Negative / Nitrite: Negative   Leuk Esterase: Negative / RBC: x / WBC x   Sq Epi: x / Non Sq Epi: x / Bacteria: x        Lactate, Blood: 1.9 mmol/L (20 @ 16:00)  Troponin T, Serum: <0.01 ng/mL (20 @ 16:00)      CARDIAC MARKERS ( 2020 16:00 )  x     / <0.01 ng/mL / x     / x     / x          RADIOLOGY:  < from: CT Angio Neck w/ IV Cont (20 @ 17:04) >  IMPRESSION:      No evidence of major vascular stenosis, occlusion, or aneurysm.    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    Incidentally noted is bilateral cervical lymphadenopathy, predominantly level 1 nodes. There is a prominent 1.5 x 1.2 cm lymph node within the right parotid gland. The parotid glands themselves are of normal size and attenuation. The right submandibular gland is noted to be larger than the left, however both submandibular glands are normal in attenuation. No gross evidence of mass or inflammatory process within the aerodigestive tract. Further evaluation should rest upon clinical grounds.    A call back was requested    < end of copied text >  < from: CT Angio Head w/ IV Cont (20 @ 17:03) >  IMPRESSION:      No evidence of major vascular stenosis, occlusion, or aneurysm.    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    Incidentally noted is bilateral cervical lymphadenopathy, predominantly level 1 nodes. There is a prominent 1.5 x 1.2 cm lymph node within the right parotid gland. The parotid glands themselves are of normal size and attenuation. The right submandibular gland is noted to be larger than the left, however both submandibular glands are normal in attenuation. No gross evidence of mass or inflammatory process within the aerodigestive tract.Further evaluation should rest upon clinical grounds.    A call back was requested    < end of copied text >  < from: Xray Chest 1 View AP/PA (20 @ 16:15) >  Impression:    No radiographic evidence of acute cardiopulmonary disease.    < end of copied text >    PHYSICAL EXAM:  GENERAL: NAD  HEAD:  Atraumatic, Normocephalic  EYES: EOMI, PERRLA, conjunctiva and sclera clear  ENMT: No tonsillar erythema, exudates, or enlargement  NECK: Supple, No JVD, Normal thyroid  NERVOUS SYSTEM:  Alert & Oriented X3, Good concentration; Non-focal- Motor Strength 5/5 B/L upper and lower extremities;  CHEST/LUNG: Clear to ascultation bilaterally; No rales, rhonchi, wheezing,   HEART: Regular rate and rhythm; No murmurs, rubs, or gallops  ABDOMEN: Soft, Nontender, Nondistended; Bowel sounds present  EXTREMITIES: No clubbing, cyanosis, or edema

## 2020-07-31 NOTE — PROGRESS NOTE ADULT - SUBJECTIVE AND OBJECTIVE BOX
HOSPITALIST ATTENDING NOTE    LAYO ALFONSO  59y Male  [id number]    INTERVAL HPI/OVERNIGHT EVENTS: still complains of frontal headache , nausea and vomiting improved - no cp.sob, palp  admits to overworking , stress and excessive coffee intake    T(C): 36.5 (07-31-20 @ 08:23), Max: 36.7 (07-30-20 @ 23:10)  HR: 77 (07-31-20 @ 08:23) (74 - 95)  BP: 114/56 (07-31-20 @ 08:23) (114/56 - 181/97)  RR: 18 (07-31-20 @ 08:23) (16 - 18)  SpO2: 97% (07-31-20 @ 08:23) (96% - 100%)  Wt(kg): --      PHYSICAL EXAM:  GENERAL: NAD  HEAD:  Atraumatic, Normocephalic  EYES: EOMI, PERRLA, conjunctiva and sclera clear  ENMT: No tonsillar erythema, exudates, or enlargement  NECK: Supple, No JVD, Normal thyroid  NERVOUS SYSTEM:  Alert & Oriented X3, Good concentration; Non-focal- Motor Strength 5/5 B/L upper and lower extremities;  CHEST/LUNG: Clear to ascultation bilaterally; No rales, rhonchi, wheezing,   HEART: Regular rate and rhythm; No murmurs, rubs, or gallops  ABDOMEN: Soft, Nontender, Nondistended; Bowel sounds present  EXTREMITIES: No clubbing, cyanosis, or edema      Consultant(s) Notes Reviewed:  [x ] YES  [ ] NO  Care Discussed with Consultants/Other Providers/ Housestaff [ x] YES  [ ] NO    LABS:                        13.8   7.83  )-----------( 174      ( 31 Jul 2020 06:09 )             42.2     07-31    140  |  106  |  8<L>  ----------------------------<  119<H>  3.9   |  23  |  0.6<L>    Ca    8.0<L>      31 Jul 2020 06:09  Mg     1.7     07-31    TPro  5.8<L>  /  Alb  4.0  /  TBili  0.4  /  DBili  x   /  AST  17  /  ALT  10  /  AlkPhos  42  07-31          RADIOLOGY & ADDITIONAL TESTS:    Imaging or report Personally Reviewed:  [ ] YES  [ ] NO    Case discussed with resident    Care discussed with pt/family      HEALTH ISSUES - PROBLEM Dx:

## 2020-07-31 NOTE — DISCHARGE NOTE PROVIDER - NSDCCPCAREPLAN_GEN_ALL_CORE_FT
PRINCIPAL DISCHARGE DIAGNOSIS  Diagnosis: Headache  Assessment and Plan of Treatment: Headache  A headache is pain or discomfort felt around the head or neck area. The specific cause of a headache may not be found as there are many types including tension headaches, migraine headaches, and cluster headaches. Watch your condition for any changes. Things you can do to manage your pain include taking over the counter and prescription medications as instructed by your health care provider, lying down in a dark quiet room, limiting stress, getting regular sleep, and refraining from alcohol and tobacco products.  SEEK IMMEDIATE MEDICAL CARE IF YOU HAVE ANY OF THE FOLLOWING SYMPTOMS: fever, vomiting, stiff neck, loss of vision, problems with speech, muscle weakness, loss of balance, trouble walking, passing out, or confusion.      SECONDARY DISCHARGE DIAGNOSES  Diagnosis: Hypertension  Assessment and Plan of Treatment: Hypertension  Hypertension, commonly called high blood pressure, is when the force of blood pumping through your arteries is too strong. Hypertension forces your heart to work harder to pump blood. Your arteries may become narrow or stiff. Having untreated or uncontrolled hypertension for a long period of time can cause heart attack, stroke, kidney disease, and other problems. If started on a medication, take exactly as prescribed by your health care professional. Maintain a healthy lifestyle and follow up with your primary care physician.  SEEK IMMEDIATE MEDICAL CARE IF YOU HAVE ANY OF THE FOLLOWING SYMPTOMS: severe headache, confusion, chest pain, abdominal pain, vomiting, or shortness of breath. PRINCIPAL DISCHARGE DIAGNOSIS  Diagnosis: Headache  Assessment and Plan of Treatment: Headache  A headache is pain or discomfort felt around the head or neck area. The specific cause of a headache may not be found as there are many types including tension headaches, migraine headaches, and cluster headaches. Watch your condition for any changes. Things you can do to manage your pain include taking over the counter and prescription medications as instructed by your health care provider, lying down in a dark quiet room, limiting stress, getting regular sleep, and refraining from alcohol and tobacco products.  SEEK IMMEDIATE MEDICAL CARE IF YOU HAVE ANY OF THE FOLLOWING SYMPTOMS: fever, vomiting, stiff neck, loss of vision, problems with speech, muscle weakness, loss of balance, trouble walking, passing out, or confusion.      SECONDARY DISCHARGE DIAGNOSES  Diagnosis: Cervical adenopathy  Assessment and Plan of Treatment: Incidentally noted is bilateral cervical lymphadenopathy, predominantly level 1 nodes. This was seen on your CT Scan. Additionally, there is a prominent 1.5 x 1.2 cm lymph node within the right parotid gland.   Please follow up with your primary care doctor for further work up.    Diagnosis: Hypertension  Assessment and Plan of Treatment: Hypertension  Hypertension, commonly called high blood pressure, is when the force of blood pumping through your arteries is too strong. Hypertension forces your heart to work harder to pump blood. Your arteries may become narrow or stiff. Having untreated or uncontrolled hypertension for a long period of time can cause heart attack, stroke, kidney disease, and other problems. If started on a medication, take exactly as prescribed by your health care professional. Maintain a healthy lifestyle and follow up with your primary care physician.  SEEK IMMEDIATE MEDICAL CARE IF YOU HAVE ANY OF THE FOLLOWING SYMPTOMS: severe headache, confusion, chest pain, abdominal pain, vomiting, or shortness of breath.

## 2020-07-31 NOTE — DISCHARGE NOTE PROVIDER - HOSPITAL COURSE
1 yr M pt with no PMHx was brought by his daughter due to headache and NBNB vomiting.        pt was doing fine till 2 pm today, after coming back from his office, his daughter came to pick him up and he told her he was feeling dizzy,    pt had vomiting right after he sat on the car, followed by another 3x NBNB vomiting so daughter decided to bring him to ER,    during their way pt started feeling of left sided headache, sharp, constant, no radiation, 10/10,     of note pt is fasting today for Yaz[employer] holiday,     Pt denies numbness, weakness, neck stiffness, visual changes, No fevers, chest pain, cough, shortness of breath, abd pain, dysuria.        In ED: stroke called , NIHS 0,     CTH showed Bilateral basal ganglia lacunar infarcts of indeterminate age,     CT Angio    No evidence of major vascular stenosis, occlusion, or aneurysm.    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    Incidentally noted is bilateral cervical lymphadenopathy,     pt admitted for further evaluation         BP was elevated, pt startted on amlodipine, will be discharged home on amlodpinine with out patient follow up. 59 yr M pt with no PMHx was brought by his daughter due to headache and NBNB vomiting.        Pt was doing fine till 2 pm today, after coming back from his office, his daughter came to pick him up and he told her he was feeling dizzy, had vomiting right after he sat on the car, followed by another 3x NBNB vomiting so daughter decided to bring him to ER. During their way pt started feeling of left sided headache, sharp, constant, no radiation, 10/10,     of note pt is fasting today for Religious holiday.  Pt denies numbness, weakness, neck stiffness, visual changes, No fevers, chest pain, cough, shortness of breath, abd pain, dysuria.        In ED: stroke called , NIHS 0,  CTH showed Bilateral basal ganglia lacunar infarcts of indeterminate age,  CT Angio. No evidence of major vascular stenosis, occlusion, or aneurysm.    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 Incidentally noted is bilateral cervical lymphadenopathy,     Pt admitted for further evaluation         BP was elevated, pt started on amlodipine and lisinopril, will be discharged home on amlodpinine with out patient follow up. MRI showed moderate-sized acute infarct in the left inferior cerebellar hemisphere (PICA territory). Mild focal mass effect without hydrocephalus. No acute intracranial hemorrhage. Retrospectively, there is severe stenosis or occlusion of the distal left PICA on the CTA from the previous day. Mild chronic microvascular changes. Chronic right caudate and periventricular lacunar infarcts. 59 yr M pt with no PMHx was brought by his daughter due to headache and NBNB vomiting.        Pt was doing fine till 2 pm today, after coming back from his office, his daughter came to pick him up and he told her he was feeling dizzy, had vomiting right after he sat on the car, followed by another 3x NBNB vomiting so daughter decided to bring him to ER. During their way pt started feeling of left sided headache, sharp, constant, no radiation, 10/10,     of note pt is fasting today for Adventism holiday.  Pt denies numbness, weakness, neck stiffness, visual changes, No fevers, chest pain, cough, shortness of breath, abd pain, dysuria.        In ED: stroke called , NIHS 0,  CTH showed Bilateral basal ganglia lacunar infarcts of indeterminate age,  CT Angio. No evidence of major vascular stenosis, occlusion, or aneurysm.    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 Incidentally noted is bilateral cervical lymphadenopathy,     Pt admitted for further evaluation         BP was elevated, pt started on amlodipine and lisinopril, will be discharged home on amlodpinine with out patient follow up. MRI showed moderate-sized acute infarct in the left inferior cerebellar hemisphere (PICA territory). Mild focal mass effect without hydrocephalus. No acute intracranial hemorrhage. Retrospectively, there is severe stenosis or occlusion of the distal left PICA on the CTA from the previous day. Mild chronic microvascular changes. Chronic right caudate and periventricular lacunar infarcts.        Attending Addendum: Case discussed with neuro attending. Patient likely had ischemic CVA due to small vessel disease. No further work up needed. Patient started on aspirin and high intensity statin. Will continue on discharge. Started on ACEI and CCB for hypertension.

## 2020-07-31 NOTE — DISCHARGE NOTE PROVIDER - PROVIDER TOKENS
FREE:[LAST:[[facility]],PHONE:[(   )    -],FAX:[(   )    -],ADDRESS:[August 12th]] FREE:[LAST:[[facility]],PHONE:[phone]],FAX:[(   )    -],ADDRESS:[[facility]],FOLLOWUP:[2 weeks]]

## 2020-07-31 NOTE — PROGRESS NOTE ADULT - ASSESSMENT
59 yr M pt with no PMHx was brought by his daughter due to HA, dizziness, vomiting     #rule out CVA- check MRI , echo, stroke unit when covid swab negative  ddx: vertigo, vertebrobasilar CVA, hypertensive urgency   seen by neuro    #uncontrolled HTN - improved now - permissive hypertension in case of stroke 140-180  if remains low then start ivfs - NS  noncomplaint with meds, diet - counselled  cont norvasc for now    #hypomagnesemia - replete and recheck    No evidence of major vascular stenosis, occlusion, or aneurysm.  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  Incidentally noted is bilateral cervical lymphadenopathy,   pt admitted for further evaluation     #bilateral cervical lymphadenopathy  - 1.5 x 1.2 cm lymph node within the right parotid gland with enlarged R submandibular, No gross evidence of mass or inflammatory  - no Hx of recurrent fever, night sweat or chills, - check LDH  - further monitor and evaluation as outpt - pt aware    discussed with team and family at bedside

## 2020-07-31 NOTE — CONSULT NOTE ADULT - SUBJECTIVE AND OBJECTIVE BOX
LAYO ALFONSO     59y     Male    MRN-[id number]                                                           CC:Patient is a 59y old  Male who presents with a chief complaint of headache and vomiting (30 Jul 2020 21:23)      HPI:  59 yr M pt with no PMHx was brought by his daughter due to headache and NBNB vomiting.    pt was doing fine till 2 pm today, after coming back from his office, his daughter came to pick him up and he told her he was feeling dizzy,  pt had vomiting right after he sat on the car, followed by another 3x NBNB vomiting so daughter decided to bring him to ER,  during their way pt started feeling of left sided headache, sharp, constant, no radiation, 10/10,   of note pt is fasting today for Temple holiday,   Pt denies numbness, weakness, neck stiffness, visual changes, No fevers, chest pain, cough, shortness of breath, abd pain, dysuria.    Patient seen and examined and reviewed notes including stroke code note.  Patient still complaining of headache and dizziness described as loss of equilibrium.  Vomited once overnight.  Results of CTA and CTH reviewed and some lymph nodes seen in cervical chain and parotid gland.    In ED: stroke called , NIHS 0,   CTH showed Bilateral basal ganglia lacunar infarcts of indeterminate age,   CT Angio  No evidence of major vascular stenosis, occlusion, or aneurysm.  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  Incidentally noted is bilateral cervical lymphadenopathy,   pt admitted for further evaluation (30 Jul 2020 21:23)      ROS:  Constitutional, Neurological, Psychiatric, Eyes, ENT, Cardiovascular, Respiratory, Gastrointestinal, Genitourinary, Musculoskeletal, Integumentary, Endocrine and Heme/Lymph are otherwise negative. Except for    Social History: No smoking, No drinking, No drug use    FAMILY HISTORY:  No pertinent family history in first degree relatives              Vital Signs Last 24 Hrs  T(C): 36.5 (31 Jul 2020 08:23), Max: 36.7 (30 Jul 2020 23:10)  T(F): 97.7 (31 Jul 2020 08:23), Max: 98 (30 Jul 2020 23:10)  HR: 77 (31 Jul 2020 08:23) (74 - 95)  BP: 114/56 (31 Jul 2020 08:23) (114/56 - 181/97)  BP(mean): --  RR: 18 (31 Jul 2020 08:23) (16 - 18)  SpO2: 97% (31 Jul 2020 08:23) (96% - 100%)    Physical Exam:  Constitutional: alert and in no acute distress.  Eyes: the sclera and conjunctiva were normal, pupils were equal in size, round, reactive to light, with normal accommodation and extraocular movements were intact.   Back: no costovertebral angle tenderness and no spinal tenderness.      Neuro Exam:  a+Ox3 language and attention normal  CN 2-12 normal with some left beating nystagmus on right lateral gaze  NO drift power 5/5  Temp, Vib, LT symmetric  FTN NL  Gait deferred    NIHSS: 0  mrankin 0      Allergies    No Known Allergies    Intolerances       Home Medications:      MEDICATIONS  (STANDING):  acetaminophen   Tablet .. 650 milliGRAM(s) Oral every 6 hours  amLODIPine   Tablet 5 milliGRAM(s) Oral daily  sodium chloride 0.9%. 1000 milliLiter(s) (100 mL/Hr) IV Continuous <Continuous>    MEDICATIONS  (PRN):  ketorolac   Injectable 30 milliGRAM(s) IV Push every 8 hours PRN Severe Pain (7 - 10)      LABS:                        13.8   7.83  )-----------( 174      ( 31 Jul 2020 06:09 )             42.2     07-31    140  |  106  |  8<L>  ----------------------------<  119<H>  3.9   |  23  |  0.6<L>    Ca    8.0<L>      31 Jul 2020 06:09  Mg     1.7     07-31    TPro  5.8<L>  /  Alb  4.0  /  TBili  0.4  /  DBili  x   /  AST  17  /  ALT  10  /  AlkPhos  42  07-31    PT/INR - ( 30 Jul 2020 16:00 )   PT: 11.20 sec;   INR: 0.97 ratio         PTT - ( 30 Jul 2020 16:00 )  PTT:25.9 sec        Neuro Imaging:  NCHCT:     < from: CT Brain Stroke Protocol (07.30.20 @ 15:43) >    IMPRESSION:    1.  No evidence of acute intracranial hemorrhage or large territory infarct.    2.  Bilateral basal ganglia lacunar infarcts of indeterminate age.    Spoke with VIKRAM COTTON on 7/30/2020 3:48 PM with readback.    < end of copied text >      < from: CT Angio Neck w/ IV Cont (07.30.20 @ 17:04) >      INTERPRETATION:  INTERPRETATION:  Clinical History / Reason for exam: Dizziness, left-sided headache    Technique/CT Angiogram of the head and neck: Axial contiguous images were obtained of the head and neck following the intravenous administration of contrast. Reformatted images in the coronal and sagittal planes were acquired, as well as 3DMIP reconstructed images.    Comparison: None    Findings:    NECK:    The visualized aortic arch and great vessel origins are patent. There is no stenosis at the origin of the right brachiocephalic, right and left common carotid, left subclavian, and right and left vertebral arteries.    The right and left common, internal and external carotid arteries are patent. There is no calcification or stenosis, at the common carotid artery bifurcations.  There is no evidence of significant stenosis or occlusion of the common carotid arteries, the carotid artery bifurcations, or along the course of the right and left internal carotid arteries.    Normal branching pattern is noted of the bilateral external carotid arteries.    The vertebral arteries are patent.    HEAD:    The distal right and left internal carotid arteries are patent, without calcification or stenosis, in the region of the cavernous portion of the ICAs.    The ophthalmic arteries are patent.    There is normal contrast filling of the middle cerebral arteries bilaterally.and the left anterior cerebral arteries. 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 No evidence of stenosis, occlusion or aneurysm..    The distal vertebral arteries are patent. Vertebral arteries are codominant. The basilar artery is patent. There is normal filling of the PCAs, SCAs, PICAs and AICAs bilaterally. No evidence of stenosis, occlusion or aneurysm.      No venous abnormalities are noted    OTHER: The lung apices are clear.    Incidentally noted is bilateral cervical lymphadenopathy, predominantly level 1 nodes. There is a prominent 1.5 x 1.2 cm lymph node within the right parotid gland. The parotid glands themselves are of normal size and attenuation. The right submandibular gland is noted to be larger than the left, however both submandibular glands are normal in attenuation. No gross evidence of mass or inflammatory process within the aerodigestive tract. Furtherevaluation should rest upon clinical grounds.    IMPRESSION:      No evidence of major vascular stenosis, occlusion, or aneurysm.    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    Incidentally noted is bilateral cervical lymphadenopathy, predominantly level 1 nodes. There is a prominent 1.5 x 1.2 cm lymph node within the right parotid gland. The parotid glands themselves are of normal size and attenuation. The right submandibular gland is noted to be larger than the left, however both submandibular glands are normal in attenuation. No gross evidence of mass or inflammatory process within the aerodigestive tract. Further evaluation should rest upon clinical grounds.    A call back was requested    < end of copied text >            Assessment / Plan: This is a 59y year old Male presenting with nausea vomiting and dizziness.  DDX: peripheral vs central etiology (i.e. vestibular vs stroke), GI pathology,   1. MRI brain w/o LIDIA  2. Consider Medical workup for vomiting and cervical lymphadenopathy  3. ASA 81mg QD and atorvastatin 80mg QD for now but if MRI brain negative for infarct this can be stopped back to home meds  4. Stroke unit  5. Repeat Labs

## 2020-07-31 NOTE — DISCHARGE NOTE PROVIDER - CARE PROVIDER_API CALL
[facility] clinic,   August 12th  Phone: (   )    -  Fax: (   )    -  Follow Up Time: [facility],   [facility]  Phone: [phone]  Fax: (   )    -  Follow Up Time: 2 weeks

## 2020-07-31 NOTE — DISCHARGE NOTE PROVIDER - NSDCMRMEDTOKEN_GEN_ALL_CORE_FT
amLODIPine 5 mg oral tablet: 1 tab(s) orally once a day  aspirin 81 mg oral delayed release tablet: 1 tab(s) orally once a day  atorvastatin 80 mg oral tablet: 1 tab(s) orally once a day (at bedtime)  clotrimazole 1% topical cream: Apply topically to affected area 3 times a day use with hydrocortisone cream  famotidine 20 mg oral tablet: 1 tab(s) orally 2 times a day   hydrocortisone 2.5% topical cream: Apply topically to affected area 3 times a day   Percocet 5/325 oral tablet: 1 tab(s) orally every 6 hours, As Needed MDD:4 tablets amLODIPine 5 mg oral tablet: 1 tab(s) orally once a day  aspirin 81 mg oral delayed release tablet: 1 tab(s) orally once a day  atorvastatin 80 mg oral tablet: 1 tab(s) orally once a day (at bedtime)  clotrimazole 1% topical cream: Apply topically to affected area 3 times a day use with hydrocortisone cream  famotidine 20 mg oral tablet: 1 tab(s) orally 2 times a day   hydrocortisone 2.5% topical cream: Apply topically to affected area 3 times a day   lisinopril 10 mg oral tablet: 1 tab(s) orally once a day  Percocet 5/325 oral tablet: 1 tab(s) orally every 6 hours, As Needed MDD:4 tablets

## 2020-07-31 NOTE — DISCHARGE NOTE PROVIDER - NSDCFUADDINST_GEN_ALL_CORE_FT
Please come to the Ambulatory clinic for follow up, your appointment is made for August 12th, 2020. Please come to the Ambulatory clinic for follow up, your appointment is made for August 12th, 2020.    [phone]

## 2020-08-01 ENCOUNTER — TRANSCRIPTION ENCOUNTER (OUTPATIENT)
Age: 60
End: 2020-08-01

## 2020-08-01 VITALS
TEMPERATURE: 99 F | DIASTOLIC BLOOD PRESSURE: 76 MMHG | HEART RATE: 80 BPM | RESPIRATION RATE: 18 BRPM | SYSTOLIC BLOOD PRESSURE: 159 MMHG

## 2020-08-01 LAB
ALBUMIN SERPL ELPH-MCNC: 3.9 G/DL — SIGNIFICANT CHANGE UP (ref 3.5–5.2)
ALP SERPL-CCNC: 38 U/L — SIGNIFICANT CHANGE UP (ref 30–115)
ALT FLD-CCNC: 10 U/L — SIGNIFICANT CHANGE UP (ref 0–41)
ANION GAP SERPL CALC-SCNC: 12 MMOL/L — SIGNIFICANT CHANGE UP (ref 7–14)
AST SERPL-CCNC: 18 U/L — SIGNIFICANT CHANGE UP (ref 0–41)
BASOPHILS # BLD AUTO: 0.01 K/UL — SIGNIFICANT CHANGE UP (ref 0–0.2)
BASOPHILS NFR BLD AUTO: 0.1 % — SIGNIFICANT CHANGE UP (ref 0–1)
BILIRUB SERPL-MCNC: 0.9 MG/DL — SIGNIFICANT CHANGE UP (ref 0.2–1.2)
BUN SERPL-MCNC: 10 MG/DL — SIGNIFICANT CHANGE UP (ref 10–20)
CALCIUM SERPL-MCNC: 8.5 MG/DL — SIGNIFICANT CHANGE UP (ref 8.5–10.1)
CHLORIDE SERPL-SCNC: 103 MMOL/L — SIGNIFICANT CHANGE UP (ref 98–110)
CO2 SERPL-SCNC: 24 MMOL/L — SIGNIFICANT CHANGE UP (ref 17–32)
CREAT SERPL-MCNC: 0.7 MG/DL — SIGNIFICANT CHANGE UP (ref 0.7–1.5)
EOSINOPHIL # BLD AUTO: 0.02 K/UL — SIGNIFICANT CHANGE UP (ref 0–0.7)
EOSINOPHIL NFR BLD AUTO: 0.2 % — SIGNIFICANT CHANGE UP (ref 0–8)
GLUCOSE SERPL-MCNC: 101 MG/DL — HIGH (ref 70–99)
HCT VFR BLD CALC: 43.9 % — SIGNIFICANT CHANGE UP (ref 42–52)
HGB BLD-MCNC: 14.1 G/DL — SIGNIFICANT CHANGE UP (ref 14–18)
IMM GRANULOCYTES NFR BLD AUTO: 0.4 % — HIGH (ref 0.1–0.3)
LYMPHOCYTES # BLD AUTO: 1.21 K/UL — SIGNIFICANT CHANGE UP (ref 1.2–3.4)
LYMPHOCYTES # BLD AUTO: 14.7 % — LOW (ref 20.5–51.1)
MAGNESIUM SERPL-MCNC: 2.3 MG/DL — SIGNIFICANT CHANGE UP (ref 1.8–2.4)
MCHC RBC-ENTMCNC: 27.5 PG — SIGNIFICANT CHANGE UP (ref 27–31)
MCHC RBC-ENTMCNC: 32.1 G/DL — SIGNIFICANT CHANGE UP (ref 32–37)
MCV RBC AUTO: 85.7 FL — SIGNIFICANT CHANGE UP (ref 80–94)
MONOCYTES # BLD AUTO: 0.49 K/UL — SIGNIFICANT CHANGE UP (ref 0.1–0.6)
MONOCYTES NFR BLD AUTO: 5.9 % — SIGNIFICANT CHANGE UP (ref 1.7–9.3)
NEUTROPHILS # BLD AUTO: 6.48 K/UL — SIGNIFICANT CHANGE UP (ref 1.4–6.5)
NEUTROPHILS NFR BLD AUTO: 78.7 % — HIGH (ref 42.2–75.2)
NRBC # BLD: 0 /100 WBCS — SIGNIFICANT CHANGE UP (ref 0–0)
PLATELET # BLD AUTO: 162 K/UL — SIGNIFICANT CHANGE UP (ref 130–400)
POTASSIUM SERPL-MCNC: 3.7 MMOL/L — SIGNIFICANT CHANGE UP (ref 3.5–5)
POTASSIUM SERPL-SCNC: 3.7 MMOL/L — SIGNIFICANT CHANGE UP (ref 3.5–5)
PROT SERPL-MCNC: 5.7 G/DL — LOW (ref 6–8)
RBC # BLD: 5.12 M/UL — SIGNIFICANT CHANGE UP (ref 4.7–6.1)
RBC # FLD: 12.6 % — SIGNIFICANT CHANGE UP (ref 11.5–14.5)
SODIUM SERPL-SCNC: 139 MMOL/L — SIGNIFICANT CHANGE UP (ref 135–146)
WBC # BLD: 8.24 K/UL — SIGNIFICANT CHANGE UP (ref 4.8–10.8)
WBC # FLD AUTO: 8.24 K/UL — SIGNIFICANT CHANGE UP (ref 4.8–10.8)

## 2020-08-01 PROCEDURE — 99238 HOSP IP/OBS DSCHRG MGMT 30/<: CPT

## 2020-08-01 PROCEDURE — 99232 SBSQ HOSP IP/OBS MODERATE 35: CPT

## 2020-08-01 RX ORDER — LISINOPRIL 2.5 MG/1
10 TABLET ORAL DAILY
Refills: 0 | Status: DISCONTINUED | OUTPATIENT
Start: 2020-08-01 | End: 2020-08-01

## 2020-08-01 RX ORDER — ATORVASTATIN CALCIUM 80 MG/1
1 TABLET, FILM COATED ORAL
Qty: 30 | Refills: 0
Start: 2020-08-01 | End: 2020-08-30

## 2020-08-01 RX ORDER — ASPIRIN/CALCIUM CARB/MAGNESIUM 324 MG
1 TABLET ORAL
Qty: 30 | Refills: 0
Start: 2020-08-01 | End: 2020-08-30

## 2020-08-01 RX ORDER — AMLODIPINE BESYLATE 2.5 MG/1
1 TABLET ORAL
Qty: 30 | Refills: 0
Start: 2020-08-01 | End: 2020-08-30

## 2020-08-01 RX ORDER — LISINOPRIL 2.5 MG/1
1 TABLET ORAL
Qty: 30 | Refills: 0
Start: 2020-08-01 | End: 2020-08-30

## 2020-08-01 RX ADMIN — Medication 650 MILLIGRAM(S): at 05:39

## 2020-08-01 RX ADMIN — HEPARIN SODIUM 5000 UNIT(S): 5000 INJECTION INTRAVENOUS; SUBCUTANEOUS at 05:40

## 2020-08-01 RX ADMIN — AMLODIPINE BESYLATE 5 MILLIGRAM(S): 2.5 TABLET ORAL at 05:39

## 2020-08-01 RX ADMIN — Medication 650 MILLIGRAM(S): at 13:05

## 2020-08-01 RX ADMIN — Medication 650 MILLIGRAM(S): at 14:50

## 2020-08-01 RX ADMIN — SODIUM CHLORIDE 100 MILLILITER(S): 9 INJECTION INTRAMUSCULAR; INTRAVENOUS; SUBCUTANEOUS at 05:41

## 2020-08-01 RX ADMIN — Medication 650 MILLIGRAM(S): at 17:02

## 2020-08-01 RX ADMIN — Medication 650 MILLIGRAM(S): at 06:35

## 2020-08-01 RX ADMIN — Medication 81 MILLIGRAM(S): at 13:06

## 2020-08-01 RX ADMIN — HEPARIN SODIUM 5000 UNIT(S): 5000 INJECTION INTRAVENOUS; SUBCUTANEOUS at 13:05

## 2020-08-01 NOTE — PROGRESS NOTE ADULT - SUBJECTIVE AND OBJECTIVE BOX
LAYO ALFONSO     59y     Male    MRN-[id number]                                                           CC:Patient is a 59y old  Male who presents with a chief complaint of headache and vomiting (30 Jul 2020 21:23)    Interval: MRI done, consistent with PICA stroke.  Vital Signs Last 24 Hrs  T(C): 36.5 (31 Jul 2020 08:23), Max: 36.7 (30 Jul 2020 23:10)  T(F): 97.7 (31 Jul 2020 08:23), Max: 98 (30 Jul 2020 23:10)  HR: 77 (31 Jul 2020 08:23) (74 - 95)  BP: 114/56 (31 Jul 2020 08:23) (114/56 - 181/97)  BP(mean): --  RR: 18 (31 Jul 2020 08:23) (16 - 18)  SpO2: 97% (31 Jul 2020 08:23) (96% - 100%)    Physical Exam:  Constitutional: alert and in no acute distress.  Eyes: the sclera and conjunctiva were normal, pupils were equal in size, round, reactive to light, with normal accommodation and extraocular movements were intact.   Back: no costovertebral angle tenderness and no spinal tenderness.      Neuro Exam:  a+Ox3 language and attention normal  CN 2-12 normal with some left beating nystagmus on right lateral gaze  NO drift power 5/5  Temp, Vib, LT symmetric  FTN NL  Gait deferred    NIHSS: 0  mrankin 0      MRI Brain, my prelim read: Acute non-hemorrhagic L PICA territory stroke    < from: CT Angio Neck w/ IV Cont (07.30.20 @ 17:04) >      INTERPRETATION:  INTERPRETATION:  Clinical History / Reason for exam: Dizziness, left-sided headache    Technique/CT Angiogram of the head and neck: Axial contiguous images were obtained of the head and neck following the intravenous administration of contrast. Reformatted images in the coronal and sagittal planes were acquired, as well as 3DMIP reconstructed images.    Comparison: None    Findings:    NECK:    The visualized aortic arch and great vessel origins are patent. There is no stenosis at the origin of the right brachiocephalic, right and left common carotid, left subclavian, and right and left vertebral arteries.    The right and left common, internal and external carotid arteries are patent. There is no calcification or stenosis, at the common carotid artery bifurcations.  There is no evidence of significant stenosis or occlusion of the common carotid arteries, the carotid artery bifurcations, or along the course of the right and left internal carotid arteries.    Normal branching pattern is noted of the bilateral external carotid arteries.    The vertebral arteries are patent.    HEAD:    The distal right and left internal carotid arteries are patent, without calcification or stenosis, in the region of the cavernous portion of the ICAs.    The ophthalmic arteries are patent.    There is normal contrast filling of the middle cerebral arteries bilaterally.and the left anterior cerebral arteries. 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 No evidence of stenosis, occlusion or aneurysm..    The distal vertebral arteries are patent. Vertebral arteries are codominant. The basilar artery is patent. There is normal filling of the PCAs, SCAs, PICAs and AICAs bilaterally. No evidence of stenosis, occlusion or aneurysm.      No venous abnormalities are noted    OTHER: The lung apices are clear.    Incidentally noted is bilateral cervical lymphadenopathy, predominantly level 1 nodes. There is a prominent 1.5 x 1.2 cm lymph node within the right parotid gland. The parotid glands themselves are of normal size and attenuation. The right submandibular gland is noted to be larger than the left, however both submandibular glands are normal in attenuation. No gross evidence of mass or inflammatory process within the aerodigestive tract. Furtherevaluation should rest upon clinical grounds.    IMPRESSION:      No evidence of major vascular stenosis, occlusion, or aneurysm.    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    Incidentally noted is bilateral cervical lymphadenopathy, predominantly level 1 nodes. There is a prominent 1.5 x 1.2 cm lymph node within the right parotid gland. The parotid glands themselves are of normal size and attenuation. The right submandibular gland is noted to be larger than the left, however both submandibular glands are normal in attenuation. No gross evidence of mass or inflammatory process within the aerodigestive tract. Further evaluation should rest upon clinical grounds.    A call back was requested    < end of copied text >      < from: Transthoracic Echocardiogram (07.31.20 @ 15:20) >  Summary:   1. Normal global left ventricular systolic function.   2. Sclerotic aortic valve with normal opening.    PHYSICIAN INTERPRETATION:  Left Ventricle: The left ventricular internal cavity size is normal. Left ventricular wall thickness is normal. Global LV systolic function was normal. Normal segmental left ventricular systolic function.  Right Ventricle: Normal right ventricular size and function.  Left Atrium: Normal left atrial size.  Right Atrium: Normal right atrial size.  Pericardium: There is no evidence of pericardial effusion.  Mitral Valve: The mitral valve is normal in structure. Trace mitral valve regurgitation is seen.  Tricuspid Valve: The tricuspid valve is normal in structure. Trivial tricuspid regurgitation is visualized.  Aortic Valve: The aortic valve is trileaflet. Sclerotic aortic valve with normal opening. No evidence of aortic valve regurgitation is seen.  Aorta: The aortic root is normal in size and structure.      < end of copied text >        Assessment / Plan: This is a 59y year old Male presenting with nausea vomiting and dizziness.      - Acute L PICA stroke    RECOMMENDATIONS:    - Continue ASA  - Continue statin  - Q4 neuro checks  - f/u A1c and lipid panel  - PT/OT  - Physiatry  - Telemetry

## 2020-08-01 NOTE — CHART NOTE - NSCHARTNOTEFT_GEN_A_CORE
<<<RESIDENT DISCHARGE NOTE>>>     LAYO ALFONSO  MRN-[id number]    VITAL SIGNS:  T(F): 97.7 (08-01-20 @ 09:00), Max: 98.8 (07-31-20 @ 14:25)  HR: 79 (08-01-20 @ 09:00)  BP: 170/83 (08-01-20 @ 09:00)  SpO2: 98% (08-01-20 @ 09:00)  Weight (kg): 75.3 (07-31-20 @ 20:00)  BMI (kg/m2): 27.6 (07-31-20 @ 20:00)    PHYSICAL EXAMINATION:  GENERAL: NAD  HEAD:  Atraumatic, Normocephalic  EYES: EOMI, PERRLA, conjunctiva and sclera clear  ENMT: No tonsillar erythema, exudates, or enlargement  NECK: Supple, No JVD, Normal thyroid  NERVOUS SYSTEM:  Alert & Oriented X3, Good concentration; Non-focal- Motor Strength 5/5 B/L upper and lower extremities;  CHEST/LUNG: Clear to ascultation bilaterally; No rales, rhonchi, wheezing,   HEART: Regular rate and rhythm; No murmurs, rubs, or gallops  ABDOMEN: Soft, Nontender, Nondistended; Bowel sounds present  EXTREMITIES: No clubbing, cyanosis, or edema    TEST RESULTS:                        14.1   8.24  )-----------( 162      ( 01 Aug 2020 05:17 )             43.9       08-01    139  |  103  |  10  ----------------------------<  101<H>  3.7   |  24  |  0.7    Ca    8.5      01 Aug 2020 05:17  Mg     2.3     08-01    TPro  5.7<L>  /  Alb  3.9  /  TBili  0.9  /  DBili  x   /  AST  18  /  ALT  10  /  AlkPhos  38  08-01      FINAL DISCHARGE INTERVIEW:  Resident(s) Present: Name: Ildefonso Singhan    DISCHARGE MEDICATION RECONCILIATION  reviewed with Attending Name: Terence    DISPOSITION:   [ x ] Home,    [  ] Home with Visiting Nursing Services,   [    ]  SNF/ NH,    [   ] Acute Rehab (4A),   [   ] Other (Specify:_________)

## 2020-08-01 NOTE — DISCHARGE NOTE NURSING/CASE MANAGEMENT/SOCIAL WORK - PATIENT PORTAL LINK FT
You can access the FollowMyHealth Patient Portal offered by [facility] by registering at the following website: [email or web address]. By joining [employer]’s FollowMyHealth portal, you will also be able to view your health information using other applications (apps) compatible with our system.

## 2020-08-01 NOTE — PROGRESS NOTE ADULT - ATTENDING COMMENTS
Patient seen and examined and agree with above except as noted.  Patients history, notes, labs, vitals, imaging, meds reviewed personally.  Patients dizziness is better able to tolerate meals now without vomiting.  Etiology of stroke unclear but likely thrombotic event.  NIHSS 0  mrankin 1    Plan as above (was naive to aspirin prior to this stroke)

## 2020-08-04 DIAGNOSIS — I63.9 CEREBRAL INFARCTION, UNSPECIFIED: ICD-10-CM

## 2020-08-04 DIAGNOSIS — R51 HEADACHE: ICD-10-CM

## 2020-08-04 DIAGNOSIS — E83.42 HYPOMAGNESEMIA: ICD-10-CM

## 2020-08-04 DIAGNOSIS — R59.0 LOCALIZED ENLARGED LYMPH NODES: ICD-10-CM

## 2020-08-04 DIAGNOSIS — I10 ESSENTIAL (PRIMARY) HYPERTENSION: ICD-10-CM

## 2020-08-19 ENCOUNTER — APPOINTMENT (OUTPATIENT)
Dept: INTERNAL MEDICINE | Facility: CLINIC | Age: 60
End: 2020-08-19
Payer: SELF-PAY

## 2020-08-19 ENCOUNTER — OUTPATIENT (OUTPATIENT)
Dept: OUTPATIENT SERVICES | Facility: HOSPITAL | Age: 60
LOS: 1 days | Discharge: HOME | End: 2020-08-19

## 2020-08-19 VITALS
HEIGHT: 65 IN | WEIGHT: 158 LBS | TEMPERATURE: 96.9 F | DIASTOLIC BLOOD PRESSURE: 84 MMHG | HEART RATE: 81 BPM | BODY MASS INDEX: 26.33 KG/M2 | SYSTOLIC BLOOD PRESSURE: 122 MMHG

## 2020-08-19 DIAGNOSIS — I10 ESSENTIAL (PRIMARY) HYPERTENSION: ICD-10-CM

## 2020-08-19 DIAGNOSIS — I63.9 CEREBRAL INFARCTION, UNSPECIFIED: ICD-10-CM

## 2020-08-19 DIAGNOSIS — Z86.73 PERSONAL HISTORY OF TRANSIENT ISCHEMIC ATTACK (TIA), AND CEREBRAL INFARCTION W/OUT RESIDUAL DEFICITS: ICD-10-CM

## 2020-08-19 PROCEDURE — 99213 OFFICE O/P EST LOW 20 MIN: CPT | Mod: GC

## 2020-08-19 RX ORDER — HYDROCORTISONE 25 MG/G
2.5 OINTMENT TOPICAL 4 TIMES DAILY
Qty: 2 | Refills: 2 | Status: DISCONTINUED | COMMUNITY
Start: 2020-01-28 | End: 2020-08-19

## 2020-08-19 RX ORDER — OXYCODONE 5 MG/1
5 TABLET ORAL
Qty: 5 | Refills: 0 | Status: DISCONTINUED | COMMUNITY
Start: 2020-02-18 | End: 2020-08-19

## 2020-08-19 RX ORDER — FAMOTIDINE 20 MG/1
20 TABLET, FILM COATED ORAL
Qty: 60 | Refills: 1 | Status: DISCONTINUED | COMMUNITY
Start: 2020-01-28 | End: 2020-08-19

## 2020-08-19 NOTE — HISTORY OF PRESENT ILLNESS
[FreeTextEntry1] : 60 y/o male presents for follow-up after recent hospital discharge for CVA [de-identified] : 60 y/o male with no PMH (only PSH of hernia repair) presents to clinic for follow-up after recent hospital admission for CVA. Patient was discharged on 8/1- he initially presented with dizziness, nausea and vomiting alongside sharp left sided headache. 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 Severe stenosis of distal left PICA noted on CTA. Neurology attending ultimately noted that the etiology of stroke was unclear but likely a thrombotic event. No further inpatient work-up was deemed necessary. Patient was started on aspirin and high intensity statin. Patient's blood pressure was elevated during admission so he was also started on amlodipine and lisinopril. \par \par Today patient states he feels well and has no overt complaints. Denies any dizziness, headache, weakness, numbness, tingling, fevers, chills, chest pain or shortness of breath. Is a bit apprehensive about returning to work.

## 2020-08-19 NOTE — PHYSICAL EXAM
[No Acute Distress] : no acute distress [Well Nourished] : well nourished [Well Developed] : well developed [Well-Appearing] : well-appearing [PERRL] : pupils equal round and reactive to light [Normal Sclera/Conjunctiva] : normal sclera/conjunctiva [EOMI] : extraocular movements intact [No JVD] : no jugular venous distention [Normal Oropharynx] : the oropharynx was normal [Normal Outer Ear/Nose] : the outer ears and nose were normal in appearance [Supple] : supple [No Lymphadenopathy] : no lymphadenopathy [No Respiratory Distress] : no respiratory distress  [Thyroid Normal, No Nodules] : the thyroid was normal and there were no nodules present [No Accessory Muscle Use] : no accessory muscle use [Clear to Auscultation] : lungs were clear to auscultation bilaterally [Normal Rate] : normal rate  [Regular Rhythm] : with a regular rhythm [Normal S1, S2] : normal S1 and S2 [No Murmur] : no murmur heard [No Carotid Bruits] : no carotid bruits [No Abdominal Bruit] : a ~M bruit was not heard ~T in the abdomen [Pedal Pulses Present] : the pedal pulses are present [No Varicosities] : no varicosities [No Edema] : there was no peripheral edema [No Palpable Aorta] : no palpable aorta [Soft] : abdomen soft [Non Tender] : non-tender [No Extremity Clubbing/Cyanosis] : no extremity clubbing/cyanosis [Non-distended] : non-distended [No Masses] : no abdominal mass palpated [Normal Bowel Sounds] : normal bowel sounds [No HSM] : no HSM [Normal Posterior Cervical Nodes] : no posterior cervical lymphadenopathy [Normal Anterior Cervical Nodes] : no anterior cervical lymphadenopathy [No Joint Swelling] : no joint swelling [No Spinal Tenderness] : no spinal tenderness [No CVA Tenderness] : no CVA  tenderness [No Rash] : no rash [Grossly Normal Strength/Tone] : grossly normal strength/tone [No Focal Deficits] : no focal deficits [Normal Gait] : normal gait [Coordination Grossly Intact] : coordination grossly intact [Normal Insight/Judgement] : insight and judgment were intact [Normal Affect] : the affect was normal

## 2020-08-19 NOTE — ASSESSMENT
[FreeTextEntry1] : 58 y/o male with no PMH (only PSH of hernia repair) presents to clinic for follow-up after recent hospital admission for CVA.\par \par #) CVA history\par - No residual deficits\par - Continue aspirin and statin \par - Will refer patient to neurology clinic for follow-up \par \par #) Hypertension \par - BP today 122/84\par - Continue amlodipine and lisinopril \par \par Return to clinic in 3 months and prn

## 2020-09-01 ENCOUNTER — OUTPATIENT (OUTPATIENT)
Dept: OUTPATIENT SERVICES | Facility: HOSPITAL | Age: 60
LOS: 1 days | Discharge: HOME | End: 2020-09-01

## 2020-09-01 ENCOUNTER — APPOINTMENT (OUTPATIENT)
Dept: NEUROLOGY | Facility: CLINIC | Age: 60
End: 2020-09-01
Payer: COMMERCIAL

## 2020-09-01 DIAGNOSIS — I63.9 CEREBRAL INFARCTION, UNSPECIFIED: ICD-10-CM

## 2020-09-01 PROCEDURE — 99213 OFFICE O/P EST LOW 20 MIN: CPT | Mod: GC

## 2020-09-01 NOTE — HISTORY OF PRESENT ILLNESS
[FreeTextEntry1] : patient is a 58 yo man who presents for follow up after L PICA CVA\par Patient reports that since adm he has been feeling well\par \par d/c'd  on asa and lipitor. needs refills\par \par needs clearance to return to work. he states that he cannot stand for prolonged hours.

## 2020-09-01 NOTE — ASSESSMENT
[FreeTextEntry1] : 58 yo man with hx of recent L PICA CVA on 8/1 presents for follow up.\par patient is stable with normal exam\par cont asa and lipitor 80 mg'\par can return to work but in limited capacity or in different dept b/c he cannot stand for prolonged periods of time\par f/u in 3 mos

## 2020-09-01 NOTE — PHYSICAL EXAM
[FreeTextEntry1] : Neurological Exam: \par Mental status: Awake, alert and oriented x3.  Recent and remote memory intact.  Naming, repetition and comprehension intact.  Attention/concentration intact.  No dysarthria, no aphasia.  Fund of knowledge appropriate.  \par Cranial nerves: Pupils equally round and reactive to light, visual fields full, no nystagmus, extraocular muscles intact, V1 through V3 intact bilaterally and symmetric, face symmetric, hearing intact to finger rub, palate elevation symmetric, tongue was midline.\par Motor:  MRC grading 5/5 b/l UE/LE.   strength 5/5.  Normal tone and bulk.  No abnormal movements.  \par Sensation: Intact to light touch, proprioception, and pinprick. \par Coordination: No dysmetria on finger-to-nose and heel-to-shin.  No dysdiadokinesia.\par Reflexes: 2+ in bilateral UE/LE, downgoing toes bilaterally. (-) Velasquez.\par Gait: Narrow and steady. No ataxia.  Romberg negative\par \par

## 2020-09-29 ENCOUNTER — APPOINTMENT (OUTPATIENT)
Dept: NEUROLOGY | Facility: CLINIC | Age: 60
End: 2020-09-29

## 2020-09-29 ENCOUNTER — OUTPATIENT (OUTPATIENT)
Dept: OUTPATIENT SERVICES | Facility: HOSPITAL | Age: 60
LOS: 1 days | Discharge: HOME | End: 2020-09-29

## 2020-09-29 ENCOUNTER — APPOINTMENT (OUTPATIENT)
Dept: NEUROLOGY | Facility: CLINIC | Age: 60
End: 2020-09-29
Payer: COMMERCIAL

## 2020-09-29 PROCEDURE — 99441: CPT

## 2020-09-29 NOTE — HISTORY OF PRESENT ILLNESS
[Home] : at home, [unfilled] , at the time of the visit. [Other Location: e.g. Home (Enter Location, City,State)___] : at [unfilled] [Verbal consent obtained from patient] : the patient, [unfilled] [FreeTextEntry1] : spoke to patient \par patient with hx of PICA CVA\par here for follow up \par patient states that he has trouble standing bc it makes him dizzy and work\par he can only stand for 2 hrs at a time [Time Spent: ___ minutes] : I have spent [unfilled] minutes with the patient on the telephone

## 2020-12-30 ENCOUNTER — APPOINTMENT (OUTPATIENT)
Dept: INTERNAL MEDICINE | Facility: CLINIC | Age: 60
End: 2020-12-30
Payer: COMMERCIAL

## 2020-12-30 ENCOUNTER — OUTPATIENT (OUTPATIENT)
Dept: OUTPATIENT SERVICES | Facility: HOSPITAL | Age: 60
LOS: 1 days | Discharge: HOME | End: 2020-12-30

## 2020-12-30 VITALS
DIASTOLIC BLOOD PRESSURE: 94 MMHG | BODY MASS INDEX: 28.32 KG/M2 | TEMPERATURE: 97.9 F | SYSTOLIC BLOOD PRESSURE: 190 MMHG | HEIGHT: 65 IN | HEART RATE: 86 BPM | WEIGHT: 170 LBS

## 2020-12-30 PROCEDURE — 99214 OFFICE O/P EST MOD 30 MIN: CPT | Mod: GC

## 2020-12-30 NOTE — PHYSICAL EXAM
[No Acute Distress] : no acute distress [Well Nourished] : well nourished [Well Developed] : well developed [Well-Appearing] : well-appearing [Normal Sclera/Conjunctiva] : normal sclera/conjunctiva [EOMI] : extraocular movements intact [Normal Outer Ear/Nose] : the outer ears and nose were normal in appearance [Normal Oropharynx] : the oropharynx was normal [No Lymphadenopathy] : no lymphadenopathy [Supple] : supple [Thyroid Normal, No Nodules] : the thyroid was normal and there were no nodules present [No Respiratory Distress] : no respiratory distress  [No Accessory Muscle Use] : no accessory muscle use [Clear to Auscultation] : lungs were clear to auscultation bilaterally [Normal Rate] : normal rate  [Regular Rhythm] : with a regular rhythm [Normal S1, S2] : normal S1 and S2 [No Murmur] : no murmur heard [No Carotid Bruits] : no carotid bruits [No Abdominal Bruit] : a ~M bruit was not heard ~T in the abdomen [No Varicosities] : no varicosities [No Edema] : there was no peripheral edema [No Extremity Clubbing/Cyanosis] : no extremity clubbing/cyanosis [Soft] : abdomen soft [Non Tender] : non-tender [Non-distended] : non-distended [Normal Bowel Sounds] : normal bowel sounds [Normal Posterior Cervical Nodes] : no posterior cervical lymphadenopathy [Normal Anterior Cervical Nodes] : no anterior cervical lymphadenopathy [No CVA Tenderness] : no CVA  tenderness [No Spinal Tenderness] : no spinal tenderness [No Joint Swelling] : no joint swelling [Grossly Normal Strength/Tone] : grossly normal strength/tone [No Rash] : no rash [Coordination Grossly Intact] : coordination grossly intact [No Focal Deficits] : no focal deficits [Normal Gait] : normal gait [Normal Affect] : the affect was normal [Normal Insight/Judgement] : insight and judgment were intact

## 2020-12-30 NOTE — REVIEW OF SYSTEMS
[Negative] : Psychiatric [Fever] : no fever [Chills] : no chills [Fatigue] : no fatigue [Night Sweats] : no night sweats [Discharge] : no discharge [Pain] : no pain [Vision Problems] : no vision problems [Earache] : no earache [Hearing Loss] : no hearing loss [Nosebleeds] : no nosebleeds [Nasal Discharge] : no nasal discharge [Sore Throat] : no sore throat [Chest Pain] : no chest pain [Palpitations] : no palpitations [Lower Ext Edema] : no lower extremity edema [Shortness Of Breath] : no shortness of breath [Cough] : no cough [Abdominal Pain] : no abdominal pain [Nausea] : no nausea [Constipation] : no constipation [Diarrhea] : no diarrhea [Dysuria] : no dysuria [Headache] : no headache [Dizziness] : no dizziness [Fainting] : no fainting [Confusion] : no confusion [Anxiety] : no anxiety [Depression] : no depression

## 2020-12-30 NOTE — ASSESSMENT
[FreeTextEntry1] : 58 y/o male, status post CVA ) presents to clinic to get clearance to return to work.\par #) CVA \par - No residual deficits\par - Continue aspirin and statin \par \par #) Hypertension \par - BP today 190/94\par - Restart  Amlodipine and Lisinopril as previously prescribed. Patient was not taking BP medications.\par #) HCM\par - Colonoscopy 2019, need repeat this year- referral for Gastroenterology sent\par - Pt refused Flu shot at this visit\par - CBC, CMP, free T4, TSH, HbA1C, Lipid panel ordered \par - RTC in 1 month

## 2020-12-30 NOTE — HISTORY OF PRESENT ILLNESS
[FreeTextEntry1] : To get clearance for work [de-identified] : 58 y/o male with PMH of HTN, CVA in July/20 (on statin and ASA) presents to clinic to get clearance for work.\par Today patient states he feels well and has no complaints. Pt states that he finished his blood pressure medications and statin in September.

## 2021-01-05 ENCOUNTER — APPOINTMENT (OUTPATIENT)
Dept: NEUROLOGY | Facility: CLINIC | Age: 61
End: 2021-01-05

## 2021-01-08 DIAGNOSIS — Z00.00 ENCOUNTER FOR GENERAL ADULT MEDICAL EXAMINATION WITHOUT ABNORMAL FINDINGS: ICD-10-CM

## 2021-01-08 DIAGNOSIS — I63.9 CEREBRAL INFARCTION, UNSPECIFIED: ICD-10-CM

## 2021-01-08 DIAGNOSIS — I10 ESSENTIAL (PRIMARY) HYPERTENSION: ICD-10-CM

## 2021-02-09 ENCOUNTER — APPOINTMENT (OUTPATIENT)
Dept: GASTROENTEROLOGY | Facility: CLINIC | Age: 61
End: 2021-02-09

## 2021-03-24 ENCOUNTER — APPOINTMENT (OUTPATIENT)
Dept: INTERNAL MEDICINE | Facility: CLINIC | Age: 61
End: 2021-03-24

## 2021-04-26 ENCOUNTER — TRANSCRIPTION ENCOUNTER (OUTPATIENT)
Age: 61
End: 2021-04-26

## 2021-04-28 ENCOUNTER — TRANSCRIPTION ENCOUNTER (OUTPATIENT)
Age: 61
End: 2021-04-28

## 2021-06-07 ENCOUNTER — OUTPATIENT (OUTPATIENT)
Dept: OUTPATIENT SERVICES | Facility: HOSPITAL | Age: 61
LOS: 1 days | Discharge: HOME | End: 2021-06-07

## 2021-06-07 ENCOUNTER — APPOINTMENT (OUTPATIENT)
Dept: INTERNAL MEDICINE | Facility: CLINIC | Age: 61
End: 2021-06-07
Payer: COMMERCIAL

## 2021-06-07 VITALS
HEART RATE: 75 BPM | OXYGEN SATURATION: 99 % | WEIGHT: 170 LBS | DIASTOLIC BLOOD PRESSURE: 82 MMHG | SYSTOLIC BLOOD PRESSURE: 142 MMHG | TEMPERATURE: 97.3 F | BODY MASS INDEX: 28.32 KG/M2 | HEIGHT: 65 IN

## 2021-06-07 PROCEDURE — 99213 OFFICE O/P EST LOW 20 MIN: CPT | Mod: GC

## 2021-06-07 RX ORDER — ASPIRIN 81 MG/1
81 TABLET, CHEWABLE ORAL
Qty: 30 | Refills: 5 | Status: COMPLETED | COMMUNITY
Start: 2020-09-01 | End: 2021-06-07

## 2021-06-09 NOTE — ASSESSMENT
[FreeTextEntry1] : 59 y/o male with hx CVA presents for f/u \par \par #) CVA history\par - No residual deficits\par - Continue aspirin and statin \par - Following with neurology \par \par #) Hypertension \par - BP today 142/82\par - Continue amlodipine and lisinopril \par \par Return to clinic in 3 months w/lab work\par Referred to GI for colonoscopy  \par

## 2021-06-09 NOTE — HISTORY OF PRESENT ILLNESS
[FreeTextEntry1] : 61 y/o male presents for follow-up [de-identified] : 59 y/o male with hx CVA presents for follow up \par

## 2021-06-10 NOTE — PATIENT PROFILE ADULT - FALLEN IN THE PAST
Coronavirus (COVID-19) Notification    Lab Result   Lab test 2019-nCoV rRt-PCR OR SARS-COV-2 PCR    Nasopharyngeal AND/OR Oropharyngeal swab is NEGATIVE for 2019-nCoV RNA [OR] SARS-COV-2 RNA (COVID-19) RNA    Your result was negative. This means that we didn't find the virus that causes COVID-19 in your sample. A test may show negative when you do actually have the virus. This can happen when the virus is in the early stages of infection, before you feel illness symptoms.    If you have symptoms   Stay home and away from others (self-isolate) until you meet ALL of the guidelines below:    You've had no fever--and no medicine that reduces fever--for 3 full days (72 hours). And      Your other symptoms have gotten better. For example, your cough or breathing has improved. And     At least 10 days have passed since your symptoms started.    During this time:    Stay home. Don't go to work, school or anywhere else.     Stay in your own room, including for meals. Use your own bathroom if you can.    Stay away from others in your home. No hugging, kissing or shaking hands. No visitors.    Clean  high touch  surfaces often (doorknobs, counters, handles, etc.). Use a household cleaning spray or wipes. You can find a full list on the EPA website at www.epa.gov/pesticide-registration/list-n-disinfectants-use-against-sars-cov-2.    Cover your mouth and nose with a mask, tissue or washcloth to avoid spreading germs.    Wash your hands and face often with soap and water.    Going back to work  Check with your employer for any guidelines to follow for going back to work.  You are sent a letter for your Employer which will serve as formal document notice that you, the employee, tested negative for COVID-19, as of the testing date shown above.    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    Where can I get more information?    [facility]:  [email or web address]    Coronavirus Basics: www.health.[facility]./diseases/coronavirus/basics.html    [facility] Hotline (422-227-0495)    {Name]  Rose Jorgensen, Triage RN, CDE       no

## 2021-06-28 ENCOUNTER — RX RENEWAL (OUTPATIENT)
Age: 61
End: 2021-06-28

## 2021-08-05 NOTE — H&P PST ADULT - PSYCHIATRIC
Affect and characteristics of appearance, verbalizations, behaviors are appropriate No Repair - Repaired With Adjacent Surgical Defect Text (Leave Blank If You Do Not Want): After obtaining clear surgical margins the defect was repaired concurrently with another surgical defect which was in close approximation.

## 2021-09-08 ENCOUNTER — OUTPATIENT (OUTPATIENT)
Dept: OUTPATIENT SERVICES | Facility: HOSPITAL | Age: 61
LOS: 1 days | Discharge: HOME | End: 2021-09-08

## 2021-09-08 ENCOUNTER — NON-APPOINTMENT (OUTPATIENT)
Age: 61
End: 2021-09-08

## 2021-09-08 ENCOUNTER — APPOINTMENT (OUTPATIENT)
Dept: INTERNAL MEDICINE | Facility: CLINIC | Age: 61
End: 2021-09-08
Payer: COMMERCIAL

## 2021-09-08 VITALS
DIASTOLIC BLOOD PRESSURE: 73 MMHG | OXYGEN SATURATION: 97 % | HEIGHT: 65 IN | BODY MASS INDEX: 28.82 KG/M2 | TEMPERATURE: 97.3 F | SYSTOLIC BLOOD PRESSURE: 133 MMHG | WEIGHT: 173 LBS | HEART RATE: 76 BPM

## 2021-09-08 DIAGNOSIS — I63.9 CEREBRAL INFARCTION, UNSPECIFIED: ICD-10-CM

## 2021-09-08 DIAGNOSIS — Z00.00 ENCOUNTER FOR GENERAL ADULT MEDICAL EXAMINATION W/OUT ABNORMAL FINDINGS: ICD-10-CM

## 2021-09-08 PROCEDURE — 99213 OFFICE O/P EST LOW 20 MIN: CPT | Mod: GC

## 2021-09-08 RX ORDER — ROSUVASTATIN CALCIUM 5 MG/1
5 TABLET, FILM COATED ORAL DAILY
Qty: 30 | Refills: 0 | Status: DISCONTINUED | COMMUNITY
Start: 2021-09-08 | End: 2021-09-08

## 2021-09-08 RX ORDER — LISINOPRIL 10 MG/1
10 TABLET ORAL DAILY
Qty: 30 | Refills: 0 | Status: DISCONTINUED | COMMUNITY
Start: 2020-08-19 | End: 2021-09-08

## 2021-09-08 RX ORDER — ATORVASTATIN CALCIUM 80 MG/1
80 TABLET, FILM COATED ORAL
Qty: 30 | Refills: 5 | Status: DISCONTINUED | COMMUNITY
Start: 2020-08-19 | End: 2021-09-08

## 2021-09-08 NOTE — ASSESSMENT
[FreeTextEntry1] : 59 y/o male with hx CVA and HTN presents for f/u \par \par #) CVA history\par - No residual deficits\par - Continue aspirin and statin \par - recently changed his atorvastatin 80mg  to rosuvastatin 5 mg\par - increase rosuvastatin to 20 mg \par - Following with neurology \par \par #) Hypertension \par - BP today \par - dc lisinopril due to cough\par - continue amlodipine and valsartan\par \par Return to clinic in 3 months \par send repeat Lipids and CMP \par lab work was performed but outside. did not bring it in with him.\par coloscopy performed at [facility]-> hemorrhoids were found. no polyps. repeat in 3 years as per GI. \par \par

## 2021-09-08 NOTE — HISTORY OF PRESENT ILLNESS
[FreeTextEntry1] : 61 y/o male presents for follow-up [de-identified] : 61 y/o male with hx CVA , htn presents for follow up Patient was started on lisinopril and amlodipine but was suffering coughs. Followed up with a doctor outside that changed his medications to valsartan. Patient BP is controlled and cough has resolved. DId a colonoscopy at [facility] and only showed hemorrhoids. Repeat colonoscopy in 3 years.\par

## 2021-09-10 DIAGNOSIS — Z00.00 ENCOUNTER FOR GENERAL ADULT MEDICAL EXAMINATION WITHOUT ABNORMAL FINDINGS: ICD-10-CM

## 2021-09-10 DIAGNOSIS — I63.9 CEREBRAL INFARCTION, UNSPECIFIED: ICD-10-CM

## 2021-09-10 DIAGNOSIS — I10 ESSENTIAL (PRIMARY) HYPERTENSION: ICD-10-CM

## 2021-12-29 ENCOUNTER — APPOINTMENT (OUTPATIENT)
Dept: INTERNAL MEDICINE | Facility: CLINIC | Age: 61
End: 2021-12-29

## 2022-01-10 ENCOUNTER — NON-APPOINTMENT (OUTPATIENT)
Age: 62
End: 2022-01-10

## 2022-01-10 ENCOUNTER — OUTPATIENT (OUTPATIENT)
Dept: OUTPATIENT SERVICES | Facility: HOSPITAL | Age: 62
LOS: 1 days | Discharge: HOME | End: 2022-01-10

## 2022-01-10 ENCOUNTER — APPOINTMENT (OUTPATIENT)
Dept: INTERNAL MEDICINE | Facility: CLINIC | Age: 62
End: 2022-01-10
Payer: SELF-PAY

## 2022-01-10 VITALS
HEART RATE: 83 BPM | HEIGHT: 65 IN | OXYGEN SATURATION: 99 % | SYSTOLIC BLOOD PRESSURE: 164 MMHG | DIASTOLIC BLOOD PRESSURE: 84 MMHG | BODY MASS INDEX: 27.82 KG/M2 | WEIGHT: 167 LBS

## 2022-01-10 DIAGNOSIS — I10 ESSENTIAL (PRIMARY) HYPERTENSION: ICD-10-CM

## 2022-01-10 PROCEDURE — 99213 OFFICE O/P EST LOW 20 MIN: CPT | Mod: GC

## 2022-01-10 RX ORDER — AMLODIPINE BESYLATE 5 MG/1
5 TABLET ORAL
Qty: 30 | Refills: 3 | Status: ACTIVE | COMMUNITY
Start: 2020-08-19 | End: 1900-01-01

## 2022-01-10 RX ORDER — AMLODIPINE AND VALSARTAN 5; 160 MG/1; MG/1
5-160 TABLET, FILM COATED ORAL DAILY
Qty: 30 | Refills: 5 | Status: ACTIVE | COMMUNITY
Start: 2021-09-08

## 2022-01-10 RX ORDER — IBUPROFEN 200 MG
0.05 TABLET ORAL 3 TIMES DAILY
Qty: 1 | Refills: 5 | Status: ACTIVE | COMMUNITY
Start: 2021-09-08 | End: 1900-01-01

## 2022-01-10 RX ORDER — ASPIRIN 81 MG/1
81 TABLET ORAL DAILY
Qty: 30 | Refills: 5 | Status: ACTIVE | COMMUNITY
Start: 2020-08-19 | End: 1900-01-01

## 2022-01-10 RX ORDER — ROSUVASTATIN CALCIUM 20 MG/1
20 TABLET, FILM COATED ORAL DAILY
Qty: 30 | Refills: 4 | Status: ACTIVE | COMMUNITY
Start: 2021-09-08 | End: 1900-01-01

## 2022-01-10 NOTE — HISTORY OF PRESENT ILLNESS
[FreeTextEntry1] : Patient presents for follow up and medication refill.  [de-identified] : 62 y/o male \par Hx CVA , HTN, dry eye  presents for follow up and medication refill.\par Patient has not been taking his home medications since October :  amlodipine , aspirin, rosuvastatin, eye drops \par \par On this visit : BP : 164/84 \par Patient was started on lisinopril and amlodipine but was suffering coughs. Followed up with a doctor outside that changed his medications to valsartan then to amlodipine. Patient BP was controlled and cough resolved. HT due to not taking medication for past few months.\par \par Did a colonoscopy at [facility] last year showing polyps ; GI recommended to him yearly colonoscopies (patient will bring the report next visit). Has daily BMs no blood , but staining occasionally \par \par Hx of CVA ; since medication stopped sometimes gets HA and dizziness rarely. \par

## 2022-01-10 NOTE — ASSESSMENT
[FreeTextEntry1] : 61 y/o male with hx CVA and HTN presents for f/u and med refill \par \par # Hypertension \par - BP elevated today ; since has not taken medication since October  \par - dc lisinopril due to cough\par - continue amlodipine \par \par # CVA history\par - No residual deficits\par - Continue aspirin and statin \par \par # Return to clinic in 3 months \par - send routine labs \par coloscopy performed at [facility] will bring GI report next visit  \par

## 2022-01-12 DIAGNOSIS — I10 ESSENTIAL (PRIMARY) HYPERTENSION: ICD-10-CM

## 2022-05-10 ENCOUNTER — APPOINTMENT (OUTPATIENT)
Dept: NEUROLOGY | Facility: CLINIC | Age: 62
End: 2022-05-10

## 2022-07-11 ENCOUNTER — APPOINTMENT (OUTPATIENT)
Dept: INTERNAL MEDICINE | Facility: CLINIC | Age: 62
End: 2022-07-11

## 2022-07-18 NOTE — H&P PST ADULT - HEIGHT IN CM
Called patient to check in on status after treatment on Friday, left VM with my contact information should he have issues, questions or concerns.
[phone]

## 2024-08-09 NOTE — HEALTH RISK ASSESSMENT
[No] : No [] : No FAMILY HISTORY:  Father  Still living? Unknown  CAD (coronary artery disease), Age at diagnosis: Age Unknown    Mother  Still living? No  CAD (coronary artery disease), Age at diagnosis: Age Unknown